# Patient Record
Sex: FEMALE | Race: WHITE | NOT HISPANIC OR LATINO | Employment: OTHER | URBAN - METROPOLITAN AREA
[De-identification: names, ages, dates, MRNs, and addresses within clinical notes are randomized per-mention and may not be internally consistent; named-entity substitution may affect disease eponyms.]

---

## 2017-07-21 ENCOUNTER — HOSPITAL ENCOUNTER (INPATIENT)
Facility: CLINIC | Age: 37
Setting detail: SURGERY ADMIT
End: 2017-07-21
Attending: NEUROLOGICAL SURGERY | Admitting: NEUROLOGICAL SURGERY
Payer: COMMERCIAL

## 2018-08-02 RX ORDER — CALCIUM CARBONATE 500(1250)
1 TABLET ORAL 2 TIMES DAILY
COMMUNITY

## 2018-08-02 RX ORDER — HYDROCODONE BITARTRATE AND ACETAMINOPHEN 10; 325 MG/1; MG/1
1 TABLET ORAL EVERY 4 HOURS PRN
COMMUNITY

## 2018-08-03 NOTE — OR NURSING
UA done on 8/1/18 showed 1+ leuk est and 1-5 micro WBC and neg nitrite.  I talked with Dr. Pearl's nurse and she said Dr. Pearl felt it was not necessary to treat.

## 2018-08-03 NOTE — PHARMACY-ADMISSION MEDICATION HISTORY
Medication reconciliation interview completed by pre-admitting nurse, reviewed by pharmacy. No further clarifications needed.     Nurse Complete Alexandra Rosales RN Thu Aug 2, 2018  4:00 PM       Prior to Admission medications    Medication Sig Last Dose Taking? Auth Provider   calcium carbonate (OS-RAVI 500 MG [place]. [place]) 500 MG tablet Take 1 tablet by mouth 2 times daily  Yes Reported, Patient   HYDROcodone-acetaminophen (NORCO)  MG per tablet Take 1 tablet by mouth every 4 hours as needed for severe pain  Yes Reported, Patient   METHOCARBAMOL PO Take 500 mg by mouth 3 times daily as needed for muscle spasms  Yes Reported, Patient   SERTRALINE HCL PO Take 100 mg by mouth daily  Yes Reported, Patient   TOPIRAMATE PO Take 50 mg by mouth daily  Yes Reported, Patient   VITAMIN D, CHOLECALCIFEROL, PO Take 3,000 Units by mouth daily  Yes Reported, Patient   IBUPROFEN PO Take 400 mg by mouth every 6 hours as needed for moderate pain   Reported, Patient

## 2018-08-03 NOTE — OR NURSING
UA done on 8/1/18 showed 1+ leuk esterase, 1-5 micro WBC, small micro bacteria & negative nitrites.  Dr. Delgado's office notified.

## 2018-08-08 NOTE — OR NURSING
Pt called, has plant dermatitis back of leg, was prescribed prednisone and wants to know if it will interfere with surgery, called to inform that she should call Dr. Rosales's office

## 2018-08-09 ENCOUNTER — HOSPITAL ENCOUNTER (INPATIENT)
Facility: CLINIC | Age: 38
LOS: 2 days | Discharge: HOME | DRG: 454 | End: 2018-08-11
Attending: NEUROLOGICAL SURGERY | Admitting: NEUROLOGICAL SURGERY
Payer: MEDICARE

## 2018-08-09 ENCOUNTER — ANESTHESIA EVENT (OUTPATIENT)
Dept: SURGERY | Facility: CLINIC | Age: 38
DRG: 454 | End: 2018-08-09
Payer: MEDICARE

## 2018-08-09 ENCOUNTER — APPOINTMENT (OUTPATIENT)
Dept: GENERAL RADIOLOGY | Facility: CLINIC | Age: 38
DRG: 454 | End: 2018-08-09
Attending: NEUROLOGICAL SURGERY
Payer: MEDICARE

## 2018-08-09 ENCOUNTER — ANESTHESIA (OUTPATIENT)
Dept: SURGERY | Facility: CLINIC | Age: 38
DRG: 454 | End: 2018-08-09
Payer: MEDICARE

## 2018-08-09 DIAGNOSIS — M51.16 LUMBAR DISC DISEASE WITH RADICULOPATHY: ICD-10-CM

## 2018-08-09 DIAGNOSIS — F11.90 CHRONIC, CONTINUOUS USE OF OPIOIDS: Primary | ICD-10-CM

## 2018-08-09 LAB
ABO + RH BLD: NORMAL
ABO + RH BLD: NORMAL
BLD GP AB SCN SERPL QL: NORMAL
BLOOD BANK CMNT PATIENT-IMP: NORMAL
HCG UR QL: NEGATIVE
SPECIMEN EXP DATE BLD: NORMAL

## 2018-08-09 PROCEDURE — 25000125 ZZHC RX 250: Performed by: NEUROLOGICAL SURGERY

## 2018-08-09 PROCEDURE — 25000132 ZZH RX MED GY IP 250 OP 250 PS 637: Mod: GY | Performed by: NEUROLOGICAL SURGERY

## 2018-08-09 PROCEDURE — 36415 COLL VENOUS BLD VENIPUNCTURE: CPT | Performed by: ANESTHESIOLOGY

## 2018-08-09 PROCEDURE — 25000128 H RX IP 250 OP 636: Performed by: ANESTHESIOLOGY

## 2018-08-09 PROCEDURE — 27210794 ZZH OR GENERAL SUPPLY STERILE: Performed by: NEUROLOGICAL SURGERY

## 2018-08-09 PROCEDURE — 99222 1ST HOSP IP/OBS MODERATE 55: CPT | Performed by: HOSPITALIST

## 2018-08-09 PROCEDURE — 86900 BLOOD TYPING SEROLOGIC ABO: CPT | Performed by: ANESTHESIOLOGY

## 2018-08-09 PROCEDURE — 12000007 ZZH R&B INTERMEDIATE

## 2018-08-09 PROCEDURE — 99223 1ST HOSP IP/OBS HIGH 75: CPT | Performed by: NURSE PRACTITIONER

## 2018-08-09 PROCEDURE — 71000013 ZZH RECOVERY PHASE 1 LEVEL 1 EA ADDTL HR: Performed by: NEUROLOGICAL SURGERY

## 2018-08-09 PROCEDURE — 37000008 ZZH ANESTHESIA TECHNICAL FEE, 1ST 30 MIN: Performed by: NEUROLOGICAL SURGERY

## 2018-08-09 PROCEDURE — C1763 CONN TISS, NON-HUMAN: HCPCS | Performed by: NEUROLOGICAL SURGERY

## 2018-08-09 PROCEDURE — 99207 ZZC CONSULT E&M CHANGED TO INITIAL LEVEL: CPT | Performed by: HOSPITALIST

## 2018-08-09 PROCEDURE — 86901 BLOOD TYPING SEROLOGIC RH(D): CPT | Performed by: ANESTHESIOLOGY

## 2018-08-09 PROCEDURE — C1713 ANCHOR/SCREW BN/BN,TIS/BN: HCPCS | Performed by: NEUROLOGICAL SURGERY

## 2018-08-09 PROCEDURE — A9270 NON-COVERED ITEM OR SERVICE: HCPCS | Mod: GY | Performed by: NEUROLOGICAL SURGERY

## 2018-08-09 PROCEDURE — 40000277 XR SURGERY CARM FLUORO LESS THAN 5 MIN W STILLS: Mod: TC

## 2018-08-09 PROCEDURE — 25000128 H RX IP 250 OP 636: Performed by: NURSE ANESTHETIST, CERTIFIED REGISTERED

## 2018-08-09 PROCEDURE — 25000566 ZZH SEVOFLURANE, EA 15 MIN: Performed by: NEUROLOGICAL SURGERY

## 2018-08-09 PROCEDURE — 27211024 ZZHC OR SUPPLY OTHER OPNP: Performed by: NEUROLOGICAL SURGERY

## 2018-08-09 PROCEDURE — 81025 URINE PREGNANCY TEST: CPT | Performed by: ANESTHESIOLOGY

## 2018-08-09 PROCEDURE — 25000128 H RX IP 250 OP 636

## 2018-08-09 PROCEDURE — 25000128 H RX IP 250 OP 636: Performed by: NEUROLOGICAL SURGERY

## 2018-08-09 PROCEDURE — 0SG3071 FUSION OF LUMBOSACRAL JOINT WITH AUTOLOGOUS TISSUE SUBSTITUTE, POSTERIOR APPROACH, POSTERIOR COLUMN, OPEN APPROACH: ICD-10-PCS | Performed by: NEUROLOGICAL SURGERY

## 2018-08-09 PROCEDURE — 25000132 ZZH RX MED GY IP 250 OP 250 PS 637: Mod: GY | Performed by: NURSE PRACTITIONER

## 2018-08-09 PROCEDURE — 86850 RBC ANTIBODY SCREEN: CPT | Performed by: ANESTHESIOLOGY

## 2018-08-09 PROCEDURE — 36000071 ZZH SURGERY LEVEL 5 W FLUORO 1ST 30 MIN: Performed by: NEUROLOGICAL SURGERY

## 2018-08-09 PROCEDURE — 71000012 ZZH RECOVERY PHASE 1 LEVEL 1 FIRST HR: Performed by: NEUROLOGICAL SURGERY

## 2018-08-09 PROCEDURE — 25000125 ZZHC RX 250: Performed by: NURSE ANESTHETIST, CERTIFIED REGISTERED

## 2018-08-09 PROCEDURE — 3E0R33Z INTRODUCTION OF ANTI-INFLAMMATORY INTO SPINAL CANAL, PERCUTANEOUS APPROACH: ICD-10-PCS | Performed by: NEUROLOGICAL SURGERY

## 2018-08-09 PROCEDURE — 36000069 ZZH SURGERY LEVEL 5 EA 15 ADDTL MIN: Performed by: NEUROLOGICAL SURGERY

## 2018-08-09 PROCEDURE — 37000009 ZZH ANESTHESIA TECHNICAL FEE, EACH ADDTL 15 MIN: Performed by: NEUROLOGICAL SURGERY

## 2018-08-09 PROCEDURE — 95940 IONM IN OPERATNG ROOM 15 MIN: CPT | Performed by: NEUROLOGICAL SURGERY

## 2018-08-09 PROCEDURE — A9270 NON-COVERED ITEM OR SERVICE: HCPCS | Mod: GY | Performed by: NURSE PRACTITIONER

## 2018-08-09 PROCEDURE — 93010 ELECTROCARDIOGRAM REPORT: CPT | Performed by: INTERNAL MEDICINE

## 2018-08-09 PROCEDURE — 07DS3ZZ EXTRACTION OF VERTEBRAL BONE MARROW, PERCUTANEOUS APPROACH: ICD-10-PCS | Performed by: NEUROLOGICAL SURGERY

## 2018-08-09 PROCEDURE — 40000306 ZZH STATISTIC PRE PROC ASSESS II: Performed by: NEUROLOGICAL SURGERY

## 2018-08-09 PROCEDURE — 0SG30A0 FUSION OF LUMBOSACRAL JOINT WITH INTERBODY FUSION DEVICE, ANTERIOR APPROACH, ANTERIOR COLUMN, OPEN APPROACH: ICD-10-PCS | Performed by: NEUROLOGICAL SURGERY

## 2018-08-09 DEVICE — IMPLANTABLE DEVICE: Type: IMPLANTABLE DEVICE | Site: SPINE LUMBAR | Status: FUNCTIONAL

## 2018-08-09 RX ORDER — KETOROLAC TROMETHAMINE 30 MG/ML
30 INJECTION, SOLUTION INTRAMUSCULAR; INTRAVENOUS EVERY 6 HOURS
Status: DISCONTINUED | OUTPATIENT
Start: 2018-08-10 | End: 2018-08-10

## 2018-08-09 RX ORDER — FENTANYL CITRATE 50 UG/ML
25-50 INJECTION, SOLUTION INTRAMUSCULAR; INTRAVENOUS
Status: DISCONTINUED | OUTPATIENT
Start: 2018-08-09 | End: 2018-08-09 | Stop reason: HOSPADM

## 2018-08-09 RX ORDER — CEFAZOLIN SODIUM 1 G/3ML
1 INJECTION, POWDER, FOR SOLUTION INTRAMUSCULAR; INTRAVENOUS SEE ADMIN INSTRUCTIONS
Status: DISCONTINUED | OUTPATIENT
Start: 2018-08-09 | End: 2018-08-09 | Stop reason: HOSPADM

## 2018-08-09 RX ORDER — KETOROLAC TROMETHAMINE 30 MG/ML
30 INJECTION, SOLUTION INTRAMUSCULAR; INTRAVENOUS EVERY 6 HOURS
Status: DISCONTINUED | OUTPATIENT
Start: 2018-08-09 | End: 2018-08-09

## 2018-08-09 RX ORDER — ACETAMINOPHEN 325 MG/1
975 TABLET ORAL EVERY 8 HOURS
Status: DISCONTINUED | OUTPATIENT
Start: 2018-08-09 | End: 2018-08-11 | Stop reason: HOSPADM

## 2018-08-09 RX ORDER — SODIUM CHLORIDE AND POTASSIUM CHLORIDE 150; 450 MG/100ML; MG/100ML
INJECTION, SOLUTION INTRAVENOUS CONTINUOUS
Status: DISCONTINUED | OUTPATIENT
Start: 2018-08-09 | End: 2018-08-10 | Stop reason: CLARIF

## 2018-08-09 RX ORDER — ONDANSETRON 4 MG/1
4 TABLET, ORALLY DISINTEGRATING ORAL EVERY 6 HOURS PRN
Status: DISCONTINUED | OUTPATIENT
Start: 2018-08-09 | End: 2018-08-11 | Stop reason: HOSPADM

## 2018-08-09 RX ORDER — PROPOFOL 10 MG/ML
INJECTION, EMULSION INTRAVENOUS PRN
Status: DISCONTINUED | OUTPATIENT
Start: 2018-08-09 | End: 2018-08-09

## 2018-08-09 RX ORDER — LORATADINE 10 MG/1
10 TABLET ORAL DAILY
COMMUNITY

## 2018-08-09 RX ORDER — NALOXONE HYDROCHLORIDE 0.4 MG/ML
.1-.4 INJECTION, SOLUTION INTRAMUSCULAR; INTRAVENOUS; SUBCUTANEOUS
Status: DISCONTINUED | OUTPATIENT
Start: 2018-08-09 | End: 2018-08-11 | Stop reason: HOSPADM

## 2018-08-09 RX ORDER — OXYCODONE HYDROCHLORIDE 5 MG/1
5 TABLET ORAL EVERY 4 HOURS PRN
Status: DISCONTINUED | OUTPATIENT
Start: 2018-08-09 | End: 2018-08-09

## 2018-08-09 RX ORDER — HYDROMORPHONE HYDROCHLORIDE 1 MG/ML
.2-.4 INJECTION, SOLUTION INTRAMUSCULAR; INTRAVENOUS; SUBCUTANEOUS
Status: DISCONTINUED | OUTPATIENT
Start: 2018-08-09 | End: 2018-08-10

## 2018-08-09 RX ORDER — HYDROXYZINE HYDROCHLORIDE 25 MG/1
25 TABLET, FILM COATED ORAL EVERY 6 HOURS PRN
Status: DISCONTINUED | OUTPATIENT
Start: 2018-08-09 | End: 2018-08-09

## 2018-08-09 RX ORDER — HYDRALAZINE HYDROCHLORIDE 20 MG/ML
2.5-5 INJECTION INTRAMUSCULAR; INTRAVENOUS EVERY 10 MIN PRN
Status: DISCONTINUED | OUTPATIENT
Start: 2018-08-09 | End: 2018-08-09 | Stop reason: HOSPADM

## 2018-08-09 RX ORDER — SERTRALINE HYDROCHLORIDE 100 MG/1
100 TABLET, FILM COATED ORAL DAILY
Status: DISCONTINUED | OUTPATIENT
Start: 2018-08-10 | End: 2018-08-11 | Stop reason: HOSPADM

## 2018-08-09 RX ORDER — ONDANSETRON 2 MG/ML
4 INJECTION INTRAMUSCULAR; INTRAVENOUS EVERY 30 MIN PRN
Status: DISCONTINUED | OUTPATIENT
Start: 2018-08-09 | End: 2018-08-09 | Stop reason: HOSPADM

## 2018-08-09 RX ORDER — ONDANSETRON 4 MG/1
4 TABLET, ORALLY DISINTEGRATING ORAL EVERY 30 MIN PRN
Status: DISCONTINUED | OUTPATIENT
Start: 2018-08-09 | End: 2018-08-09 | Stop reason: HOSPADM

## 2018-08-09 RX ORDER — METHOCARBAMOL 500 MG/1
500 TABLET, FILM COATED ORAL 3 TIMES DAILY PRN
Status: DISCONTINUED | OUTPATIENT
Start: 2018-08-09 | End: 2018-08-11 | Stop reason: HOSPADM

## 2018-08-09 RX ORDER — ONDANSETRON 2 MG/ML
INJECTION INTRAMUSCULAR; INTRAVENOUS PRN
Status: DISCONTINUED | OUTPATIENT
Start: 2018-08-09 | End: 2018-08-09

## 2018-08-09 RX ORDER — OXYCODONE HYDROCHLORIDE 5 MG/1
5-10 TABLET ORAL
Status: DISCONTINUED | OUTPATIENT
Start: 2018-08-09 | End: 2018-08-09 | Stop reason: DRUGHIGH

## 2018-08-09 RX ORDER — DEXAMETHASONE SODIUM PHOSPHATE 4 MG/ML
INJECTION, SOLUTION INTRA-ARTICULAR; INTRALESIONAL; INTRAMUSCULAR; INTRAVENOUS; SOFT TISSUE PRN
Status: DISCONTINUED | OUTPATIENT
Start: 2018-08-09 | End: 2018-08-09

## 2018-08-09 RX ORDER — GLYCOPYRROLATE 0.2 MG/ML
INJECTION, SOLUTION INTRAMUSCULAR; INTRAVENOUS PRN
Status: DISCONTINUED | OUTPATIENT
Start: 2018-08-09 | End: 2018-08-09

## 2018-08-09 RX ORDER — HYDROMORPHONE HYDROCHLORIDE 1 MG/ML
.3-.5 INJECTION, SOLUTION INTRAMUSCULAR; INTRAVENOUS; SUBCUTANEOUS EVERY 10 MIN PRN
Status: DISCONTINUED | OUTPATIENT
Start: 2018-08-09 | End: 2018-08-09 | Stop reason: HOSPADM

## 2018-08-09 RX ORDER — CYCLOBENZAPRINE HCL 10 MG
10 TABLET ORAL 3 TIMES DAILY PRN
Status: DISCONTINUED | OUTPATIENT
Start: 2018-08-09 | End: 2018-08-09

## 2018-08-09 RX ORDER — OXYCODONE HYDROCHLORIDE 5 MG/1
10-20 TABLET ORAL EVERY 4 HOURS PRN
Status: DISCONTINUED | OUTPATIENT
Start: 2018-08-09 | End: 2018-08-09

## 2018-08-09 RX ORDER — CEFAZOLIN SODIUM 1 G/50ML
1 INJECTION, SOLUTION INTRAVENOUS EVERY 8 HOURS
Status: COMPLETED | OUTPATIENT
Start: 2018-08-09 | End: 2018-08-10

## 2018-08-09 RX ORDER — OXYCODONE HYDROCHLORIDE 5 MG/1
15-20 TABLET ORAL
Status: DISCONTINUED | OUTPATIENT
Start: 2018-08-09 | End: 2018-08-11 | Stop reason: HOSPADM

## 2018-08-09 RX ORDER — FENTANYL CITRATE 50 UG/ML
25-50 INJECTION, SOLUTION INTRAMUSCULAR; INTRAVENOUS EVERY 5 MIN PRN
Status: DISCONTINUED | OUTPATIENT
Start: 2018-08-09 | End: 2018-08-09 | Stop reason: HOSPADM

## 2018-08-09 RX ORDER — TOPIRAMATE 25 MG/1
50 TABLET, FILM COATED ORAL DAILY
Status: DISCONTINUED | OUTPATIENT
Start: 2018-08-09 | End: 2018-08-10 | Stop reason: ALTCHOICE

## 2018-08-09 RX ORDER — PREDNISONE 20 MG/1
20 TABLET ORAL DAILY
Status: DISCONTINUED | OUTPATIENT
Start: 2018-08-09 | End: 2018-08-11 | Stop reason: HOSPADM

## 2018-08-09 RX ORDER — SODIUM CHLORIDE, SODIUM LACTATE, POTASSIUM CHLORIDE, CALCIUM CHLORIDE 600; 310; 30; 20 MG/100ML; MG/100ML; MG/100ML; MG/100ML
INJECTION, SOLUTION INTRAVENOUS CONTINUOUS
Status: DISCONTINUED | OUTPATIENT
Start: 2018-08-09 | End: 2018-08-09 | Stop reason: HOSPADM

## 2018-08-09 RX ORDER — METOPROLOL TARTRATE 1 MG/ML
1-2 INJECTION, SOLUTION INTRAVENOUS EVERY 5 MIN PRN
Status: DISCONTINUED | OUTPATIENT
Start: 2018-08-09 | End: 2018-08-09 | Stop reason: HOSPADM

## 2018-08-09 RX ORDER — LIDOCAINE 40 MG/G
CREAM TOPICAL
Status: DISCONTINUED | OUTPATIENT
Start: 2018-08-09 | End: 2018-08-09 | Stop reason: HOSPADM

## 2018-08-09 RX ORDER — ACETAMINOPHEN 325 MG/1
650 TABLET ORAL EVERY 4 HOURS PRN
Status: DISCONTINUED | OUTPATIENT
Start: 2018-08-12 | End: 2018-08-11 | Stop reason: HOSPADM

## 2018-08-09 RX ORDER — ONDANSETRON 2 MG/ML
4 INJECTION INTRAMUSCULAR; INTRAVENOUS EVERY 6 HOURS PRN
Status: DISCONTINUED | OUTPATIENT
Start: 2018-08-09 | End: 2018-08-11 | Stop reason: HOSPADM

## 2018-08-09 RX ORDER — LIDOCAINE 4 G/G
1 PATCH TOPICAL
Status: DISCONTINUED | OUTPATIENT
Start: 2018-08-09 | End: 2018-08-11 | Stop reason: HOSPADM

## 2018-08-09 RX ORDER — CEFAZOLIN SODIUM 2 G/100ML
2 INJECTION, SOLUTION INTRAVENOUS
Status: COMPLETED | OUTPATIENT
Start: 2018-08-09 | End: 2018-08-09

## 2018-08-09 RX ORDER — HYDROMORPHONE HYDROCHLORIDE 1 MG/ML
.5-1 INJECTION, SOLUTION INTRAMUSCULAR; INTRAVENOUS; SUBCUTANEOUS
Status: DISCONTINUED | OUTPATIENT
Start: 2018-08-09 | End: 2018-08-09

## 2018-08-09 RX ORDER — HYDROMORPHONE HYDROCHLORIDE 1 MG/ML
0.5 INJECTION, SOLUTION INTRAMUSCULAR; INTRAVENOUS; SUBCUTANEOUS ONCE
Status: COMPLETED | OUTPATIENT
Start: 2018-08-09 | End: 2018-08-09

## 2018-08-09 RX ORDER — ALBUTEROL SULFATE 0.83 MG/ML
2.5 SOLUTION RESPIRATORY (INHALATION) EVERY 4 HOURS PRN
Status: DISCONTINUED | OUTPATIENT
Start: 2018-08-09 | End: 2018-08-09 | Stop reason: HOSPADM

## 2018-08-09 RX ORDER — ZOLPIDEM TARTRATE 5 MG/1
5 TABLET ORAL
Status: DISCONTINUED | OUTPATIENT
Start: 2018-08-10 | End: 2018-08-11 | Stop reason: HOSPADM

## 2018-08-09 RX ORDER — NEOSTIGMINE METHYLSULFATE 1 MG/ML
VIAL (ML) INJECTION PRN
Status: DISCONTINUED | OUTPATIENT
Start: 2018-08-09 | End: 2018-08-09

## 2018-08-09 RX ORDER — LORATADINE 10 MG/1
10 TABLET ORAL DAILY
Status: DISCONTINUED | OUTPATIENT
Start: 2018-08-10 | End: 2018-08-11 | Stop reason: HOSPADM

## 2018-08-09 RX ORDER — LIDOCAINE HYDROCHLORIDE 10 MG/ML
INJECTION, SOLUTION INFILTRATION; PERINEURAL PRN
Status: DISCONTINUED | OUTPATIENT
Start: 2018-08-09 | End: 2018-08-09

## 2018-08-09 RX ORDER — BUPIVACAINE HYDROCHLORIDE 7.5 MG/ML
INJECTION, SOLUTION EPIDURAL; RETROBULBAR PRN
Status: DISCONTINUED | OUTPATIENT
Start: 2018-08-09 | End: 2018-08-09 | Stop reason: HOSPADM

## 2018-08-09 RX ORDER — LIDOCAINE 40 MG/G
CREAM TOPICAL
Status: DISCONTINUED | OUTPATIENT
Start: 2018-08-09 | End: 2018-08-11 | Stop reason: HOSPADM

## 2018-08-09 RX ORDER — DIAZEPAM 10 MG/2ML
2.5 INJECTION, SOLUTION INTRAMUSCULAR; INTRAVENOUS ONCE
Status: COMPLETED | OUTPATIENT
Start: 2018-08-09 | End: 2018-08-09

## 2018-08-09 RX ORDER — NALOXONE HYDROCHLORIDE 0.4 MG/ML
.1-.4 INJECTION, SOLUTION INTRAMUSCULAR; INTRAVENOUS; SUBCUTANEOUS
Status: DISCONTINUED | OUTPATIENT
Start: 2018-08-09 | End: 2018-08-09 | Stop reason: HOSPADM

## 2018-08-09 RX ORDER — HYDROMORPHONE HYDROCHLORIDE 1 MG/ML
INJECTION, SOLUTION INTRAMUSCULAR; INTRAVENOUS; SUBCUTANEOUS
Status: COMPLETED
Start: 2018-08-09 | End: 2018-08-09

## 2018-08-09 RX ORDER — MEPERIDINE HYDROCHLORIDE 50 MG/ML
12.5 INJECTION INTRAMUSCULAR; INTRAVENOUS; SUBCUTANEOUS
Status: DISCONTINUED | OUTPATIENT
Start: 2018-08-09 | End: 2018-08-09 | Stop reason: HOSPADM

## 2018-08-09 RX ORDER — HYDROXYZINE HYDROCHLORIDE 25 MG/1
25-50 TABLET, FILM COATED ORAL EVERY 6 HOURS
Status: DISCONTINUED | OUTPATIENT
Start: 2018-08-09 | End: 2018-08-11 | Stop reason: HOSPADM

## 2018-08-09 RX ORDER — FENTANYL CITRATE 50 UG/ML
INJECTION, SOLUTION INTRAMUSCULAR; INTRAVENOUS PRN
Status: DISCONTINUED | OUTPATIENT
Start: 2018-08-09 | End: 2018-08-09

## 2018-08-09 RX ADMIN — ROCURONIUM BROMIDE 30 MG: 10 INJECTION INTRAVENOUS at 10:10

## 2018-08-09 RX ADMIN — HYDROMORPHONE HYDROCHLORIDE 1 MG: 1 INJECTION, SOLUTION INTRAMUSCULAR; INTRAVENOUS; SUBCUTANEOUS at 10:40

## 2018-08-09 RX ADMIN — PROPOFOL 200 MG: 10 INJECTION, EMULSION INTRAVENOUS at 10:10

## 2018-08-09 RX ADMIN — ACETAMINOPHEN 975 MG: 325 TABLET, FILM COATED ORAL at 16:11

## 2018-08-09 RX ADMIN — Medication 0.5 MG: at 12:18

## 2018-08-09 RX ADMIN — Medication 0.5 MG: at 14:04

## 2018-08-09 RX ADMIN — HYDROXYZINE HYDROCHLORIDE 25 MG: 25 TABLET ORAL at 21:51

## 2018-08-09 RX ADMIN — DEXMEDETOMIDINE HYDROCHLORIDE 0.4 MCG/KG/HR: 100 INJECTION, SOLUTION INTRAVENOUS at 10:16

## 2018-08-09 RX ADMIN — OXYCODONE HYDROCHLORIDE 20 MG: 5 TABLET ORAL at 21:51

## 2018-08-09 RX ADMIN — DEXAMETHASONE SODIUM PHOSPHATE 8 MG: 4 INJECTION, SOLUTION INTRA-ARTICULAR; INTRALESIONAL; INTRAMUSCULAR; INTRAVENOUS; SOFT TISSUE at 10:10

## 2018-08-09 RX ADMIN — OXYCODONE HYDROCHLORIDE 10 MG: 5 TABLET ORAL at 15:59

## 2018-08-09 RX ADMIN — GLYCOPYRROLATE 0.6 MG: 0.2 INJECTION, SOLUTION INTRAMUSCULAR; INTRAVENOUS at 11:31

## 2018-08-09 RX ADMIN — Medication 0.5 MG: at 12:47

## 2018-08-09 RX ADMIN — Medication 0.5 MG: at 09:33

## 2018-08-09 RX ADMIN — ACETAMINOPHEN 975 MG: 325 TABLET, FILM COATED ORAL at 23:10

## 2018-08-09 RX ADMIN — LIDOCAINE 1 PATCH: 560 PATCH PERCUTANEOUS; TOPICAL; TRANSDERMAL at 19:47

## 2018-08-09 RX ADMIN — Medication 3 MG: at 11:31

## 2018-08-09 RX ADMIN — FENTANYL CITRATE 100 MCG: 50 INJECTION, SOLUTION INTRAMUSCULAR; INTRAVENOUS at 10:10

## 2018-08-09 RX ADMIN — HYDROXYZINE HYDROCHLORIDE 25 MG: 25 TABLET ORAL at 16:11

## 2018-08-09 RX ADMIN — Medication 1 MG: at 15:45

## 2018-08-09 RX ADMIN — MIDAZOLAM 2 MG: 1 INJECTION INTRAMUSCULAR; INTRAVENOUS at 10:03

## 2018-08-09 RX ADMIN — FENTANYL CITRATE 50 MCG: 50 INJECTION INTRAMUSCULAR; INTRAVENOUS at 12:40

## 2018-08-09 RX ADMIN — Medication 0.5 MG: at 12:25

## 2018-08-09 RX ADMIN — Medication 1 MG: at 17:54

## 2018-08-09 RX ADMIN — HYDROXYZINE HYDROCHLORIDE 25 MG: 25 TABLET ORAL at 23:05

## 2018-08-09 RX ADMIN — LIDOCAINE HYDROCHLORIDE 50 MG: 10 INJECTION, SOLUTION INFILTRATION; PERINEURAL at 10:10

## 2018-08-09 RX ADMIN — FENTANYL CITRATE 50 MCG: 50 INJECTION INTRAMUSCULAR; INTRAVENOUS at 12:13

## 2018-08-09 RX ADMIN — Medication 2.5 MG: at 13:19

## 2018-08-09 RX ADMIN — PROPOFOL 100 MG: 10 INJECTION, EMULSION INTRAVENOUS at 10:45

## 2018-08-09 RX ADMIN — PREDNISONE 20 MG: 20 TABLET ORAL at 16:06

## 2018-08-09 RX ADMIN — ONDANSETRON 4 MG: 2 INJECTION INTRAMUSCULAR; INTRAVENOUS at 10:54

## 2018-08-09 RX ADMIN — KETOROLAC TROMETHAMINE 30 MG: 30 INJECTION, SOLUTION INTRAMUSCULAR at 23:11

## 2018-08-09 RX ADMIN — SODIUM CHLORIDE, POTASSIUM CHLORIDE, SODIUM LACTATE AND CALCIUM CHLORIDE: 600; 310; 30; 20 INJECTION, SOLUTION INTRAVENOUS at 10:30

## 2018-08-09 RX ADMIN — Medication 2.5 MG: at 14:37

## 2018-08-09 RX ADMIN — CEFAZOLIN SODIUM 1 G: 1 INJECTION, SOLUTION INTRAVENOUS at 18:57

## 2018-08-09 RX ADMIN — CEFAZOLIN SODIUM 2 G: 2 INJECTION, SOLUTION INTRAVENOUS at 10:03

## 2018-08-09 RX ADMIN — HYDROMORPHONE HYDROCHLORIDE 1 MG: 1 INJECTION, SOLUTION INTRAMUSCULAR; INTRAVENOUS; SUBCUTANEOUS at 10:16

## 2018-08-09 RX ADMIN — SODIUM CHLORIDE, POTASSIUM CHLORIDE, SODIUM LACTATE AND CALCIUM CHLORIDE: 600; 310; 30; 20 INJECTION, SOLUTION INTRAVENOUS at 10:03

## 2018-08-09 RX ADMIN — TOPIRAMATE 50 MG: 25 TABLET, FILM COATED ORAL at 15:59

## 2018-08-09 RX ADMIN — METHOCARBAMOL 500 MG: 500 TABLET ORAL at 19:45

## 2018-08-09 RX ADMIN — FENTANYL CITRATE 50 MCG: 50 INJECTION INTRAMUSCULAR; INTRAVENOUS at 12:25

## 2018-08-09 RX ADMIN — POTASSIUM CHLORIDE AND SODIUM CHLORIDE: 450; 150 INJECTION, SOLUTION INTRAVENOUS at 16:25

## 2018-08-09 RX ADMIN — OXYCODONE HYDROCHLORIDE 20 MG: 5 TABLET ORAL at 18:51

## 2018-08-09 RX ADMIN — HYDROMORPHONE HYDROCHLORIDE 1 MG: 1 INJECTION, SOLUTION INTRAMUSCULAR; INTRAVENOUS; SUBCUTANEOUS at 10:45

## 2018-08-09 ASSESSMENT — ACTIVITIES OF DAILY LIVING (ADL)
ADLS_ACUITY_SCORE: 13
ADLS_ACUITY_SCORE: 11

## 2018-08-09 NOTE — IP AVS SNAPSHOT
[facility]    [address] [facility] [address]    Phone:  [phone]    Fax:  [phone]                                       After Visit Summary   8/9/2018    Tiffany Solis    MRN: [id number]           After Visit Summary Signature Page     I have received my discharge instructions, and my questions have been answered. I have discussed any challenges I see with this plan with the nurse or doctor.    ..........................................................................................................................................  Patient/Patient Representative Signature      ..........................................................................................................................................  Patient Representative Print Name and Relationship to Patient    ..................................................               ................................................  Date                                            Time    ..........................................................................................................................................  Reviewed by Signature/Title    ...................................................              ..............................................  Date                                                            Time

## 2018-08-09 NOTE — OP NOTE
REPORT OF OPERATION  Tiffany Solis is a 37 year old old female admitted on 8/9/2018  7:52 AM.    Operative Date:  8/9/2018  PRE-PROCEDURE DIAGNOSIS:  1) L5/S1 degenerative disc disease.  2)BMI 47 Obesety  POST-PROCEDURE DIAGNOSIS:  1) Same as above  PROCEDURE PERFORMED:  1)  L5/S1 oblique lateral lumbar interbody fusion with discectomy, preparation of the endplate and placement of a bullet cage packed with calcium triphosphate soaked in bone marrow anterior to the transverse process in modified prone position, with intraoperative biplanar fluoroscopic imaging and electrophysiological monitoring.  2)  L5/S1 Posterior minimally invasive pedicle screw placement and posterolateral instrumentation and fusion with  intraoperative biplanar fluoroscopic imaging and electrophysiological monitoring.  3) Epidural steroid injection.  4) Transpedicular Bone marrow aspiration  5) Injection of 0.75 marcain  in paracvertebral tissue for post op pain management  Surgeon: Thu Delgado MD  ASSISTANT: Angelique Nguyen MD   HISTORY: Please refer to my clinic note for full details, but in short, patient is a 37 -year-old female with severe back pain and radiculopathy not responding to usual conservative therapy. Patient was set up for the surgery as mentioned above and was taken to surgery as mentioned above after all risks and benefits were explained.  PROCEDURE:  The patient was taken to surgery. After general anesthesia was applied, SCDs and Caraballo placed and preoperative antibiotic given, then patient has been positioned on the Fidel table and Demetrius frame in a modified prone position for ease of access from the left side.  AP and lateral fluoroscopic images are positioned. Patient has been prepped and draped in sterile fashion. The landmarks, including Spinal process, transverse process, disk space, endplates and pedicels are identified and marked.    A Jamshidi needle is placed in upper right pedicle inside of the vertebral  body and bone marrow has been aspirated to be mixed with biologics to introduce Stem cells to the biologics.  Following steps are then taken for levels:    L5/S1   Cage size 10 mm high and 27 mm long Titanium  The patient was turned using the rotation of the surgical table so that a near direct anterior-lateral approach to the lumbar spine could be achieved. A smal  incision was then made superior to the mid iliac crest and then using biplanar fluoroscopic visualization, under electrophysiological monitoring and stimulation, we introduced an electrophysiological probe through the retro peritoneal space into the desired discs anterior to the transverse processes and then passed it into the disc space after finding a silent window. The sleeve is retained and the probe is removed, then a K wire was passed sequentially into the disk space. A dilating tube was then passed along this same route. Following this, a working channel was then passed sequentially into the disc spaces. The working channel was manually held in position while a series of disc cleaning tools was passed through the channel to remove the affected discs under clear and direct biplanar fluoroscopic visualization, decompress the nerve roots, and decorticate the vertebral endplates at those segments.    Arthrodesis of the intervertebral spaces via an anterior retroperitoneal exposure and application of an intervertebral biomechanical device was then accomplished by using the working channel that had been placed in the retroperitoneal space anterior to the transverse processes. After adequate decompression and preparation of the endplates, we then put calcium triphosphate soaked in bone marrow anterior into disc space and then a PEEK interbody was packed tightly with allograft bone for stabilization and arthrodesis of the intervertebral spaces and inserted into the mid portion of the intervertebral discs. This was done under biplanar fluoroscopic  visualization. All bone was confined to the borders of the disc space. The working channel was then removed.    Following steps are then taken for levels:  L5 7.5mm Screw size Right 55 Left 55  Bicortical for High BMI and for mechanical stability    S1 7.5mm Screw size Right 50 Left 50  Bicortical for High BMI and for mechanical stability      Then patient is rotated for a true prone position. Then entry point for the pedicles is identified in the AP and lateral view, and then skin incision has been injected with local anesthetic. Then we entered the pedicle with a Jamshidi needle. Over the Jamshidi needle, we introduced the K wire in Vertebral body. Additionally we use a small periostal elevator along the screws to refresh the surface of the bone and facet and put minimal amount of Calcium-triphoisphate soaked in bone marrow for additional posterolateral fusion. Over the K wire then , we dilate the muscle with the dilator and then put a pedicle screws bilaterally. Screws are all silent up to  25 MA of stimulation. After screws are all placed, we put marlena in place and under fluoroscopic imaging, we locked the marlena in place and removed the screw tops and then each incision has been closed with 2-0 Vicryl suture and then Steri-Strips applied.  Before the end of the surgery, we injected 40mg Kenalog and 1 cc 0.25% Marcaine for epidural steroid injection in epidural space under fluoroscopic imaging after we introduced the spinal needle and confirmed with injecting 2cc air and aspiration which confirms we are in the epidural space and no CSF is returned.  20 cc of marcaine 0.75 was injected into deep tissue at the end of surgery for post operative pain management.  Before closing skin we inject 17 cc 0.75% marcaine in paravertebral tissue for post op pain management.    Additional finding: BMI 47  Obesity made the surgery technically more challenging and so  extended the surgery time  Estimated blood: 28cc.    DISPOSITION:  To PACU with postoperative antibiotic. All counts are correct at the end of the surgery.  Thu Delgado MD  cc: Thu Delgado MD

## 2018-08-09 NOTE — ANESTHESIA CARE TRANSFER NOTE
Patient: Tiffany Solis    Procedure(s):  L5 to S1 Oblique lumbar interbody fusion - Wound Class: I-Clean    Diagnosis: DDD, HNP  Diagnosis Additional Information: No value filed.    Anesthesia Type:   General, ETT     Note:  Airway :Face Mask  Patient transferred to:PACU  Handoff Report: Identifed the Patient, Identified the Reponsible Provider, Reviewed the pertinent medical history, Discussed the surgical course, Reviewed Intra-OP anesthesia mangement and issues during anesthesia, Set expectations for post-procedure period and Allowed opportunity for questions and acknowledgement of understanding      Vitals: (Last set prior to Anesthesia Care Transfer)    CRNA VITALS  8/9/2018 1113 - 8/9/2018 1150      8/9/2018             NIBP: 118/72    NIBP Mean: 89                Electronically Signed By: BAILEY Pacheco CRNA  August 9, 2018  11:50 AM

## 2018-08-09 NOTE — CONSULTS
[facility]  Hospitalist Consult Note    Name: Tiffany Solis      MRN: [id number]  YOB: 1980    Age: 37 year old  Date of admission: 8/9/2018  Primary care provider: Sandra Bender     Requesting Physician:  Dr Jenkins  Reason for consultation:  Post-operative medical management         Assessment and Plan:   Tiffany Solis is a 37 year old female with a history of depression, migraines, and lumbar spine disease who was admitted for lumbar spine fusion.    1. Lumbar fusion on 8/9: The patient is doing well, currently has well controlled pain and is hemodynamically stable. The hospitalist service will defer diet, activity, DVT prophylaxis, and pain control to the surgical service. Currently the patient is on PCDs for DVT prophylaxis. We agree with continued physical and occupational therapy. Social work may be consulted for possible placement needs. Please continue incentive spirometry and check routine follow up hemoglobin to evaluate for surgical blood loss and the potential need for transfusion.     2. Depression and migraines: Stable.  Resumed PTA medications.    3.  Dermatitis: Resumed 5 day course of prednisone as already approved by spine surgery.      Code status: FULL  Prophylaxis: Currently on PCDs, ultimately deferred to the primary service.  Disposition: Deferred to the primary service    Thank you for the consultation, we will continue to follow along during the hospitalization. Please page with any questions or concerns.         History of Present Illness:   Tiffany Solis is a 37 year old female who is being hospitalized for lumbar fusion. Pre-operative note was fully reviewed and recommendations acknowledged. Op note and anesthesia notes and flow sheets were also reviewed.     The patient had no complications related to the procedure and has had an unremarkable post-operative course to this point. Currently pain is adequately controlled. No nausea, vomiting,  "diarrhea or constipation. No fevers, chills, diaphoresis. No chest pain, palpitations, dyspnea. No excessive somnolence and patient is fully alert and oriented. The patient has no other complaints at this time.                Past Medical History:     Past Medical History:   Diagnosis Date     Depression     & anxiety     Low back pain     & radiates down both legs (mainly left leg).  Also, has numbness & tingling down both legs.     Migraine              Past Surgical History:     Past Surgical History:   Procedure Laterality Date     BACK SURGERY      cervical fusion x 7     ENT SURGERY      Tonsillectomy     GYN SURGERY      tubal ligation               Social History:     Social History   Substance Use Topics     Smoking status: Never Smoker     Smokeless tobacco: Never Used     Alcohol use Yes      Comment: rare             Family History:   Family history was fully reviewed and non-contributory in this case.          Allergies:     Allergies   Allergen Reactions     Kiwi Other (See Comments)     \"my mouth bleeds\"     Neurontin [Gabapentin] Other (See Comments)     Memory loss     Pineapple Other (See Comments)     \"my throat swells\"     Seasonal Allergies Other (See Comments)     hayfever symptoms             Medications:     Prior to Admission medications    Medication Sig Last Dose Taking? Auth Provider   calcium carbonate (OS-RAVI 500 MG [place]. [place]) 500 MG tablet Take 1 tablet by mouth 2 times daily 8/2/2018 Yes Reported, Patient   HYDROcodone-acetaminophen (NORCO)  MG per tablet Take 1 tablet by mouth every 4 hours as needed for severe pain 8/9/2018 at Unknown time Yes Reported, Patient   loratadine (CLARITIN) 10 MG tablet Take 10 mg by mouth daily 8/9/2018 at Unknown time Yes Reported, Patient   METHOCARBAMOL PO Take 500 mg by mouth 3 times daily as needed for muscle spasms Past Week at Unknown time Yes Reported, Patient   PREDNISONE PO Take 20 mg by mouth daily 8/9/2018 at Unknown time Yes Reported, " "Patient   SERTRALINE HCL PO Take 100 mg by mouth daily 8/9/2018 at Unknown time Yes Reported, Patient   TOPIRAMATE PO Take 50 mg by mouth daily 8/9/2018 at Unknown time Yes Reported, Patient   VITAMIN D, CHOLECALCIFEROL, PO Take 3,000 Units by mouth daily 8/2/2018 Yes Reported, Patient   IBUPROFEN PO Take 400 mg by mouth every 6 hours as needed for moderate pain 8/2/2018  Reported, Patient       Current hospital administered medication list (MAR) also reviewed.          Review of Systems:   A comprehensive greater than 10 system review of systems was carried out.  Pertinent positives and negatives are noted above.  Otherwise negative for contributory info.            Physical Exam:   Blood pressure 115/65, temperature 98.1  F (36.7  C), temperature source Temporal, resp. rate 14, height 1.549 m (5' 1\"), weight 112.9 kg (249 lb), last menstrual period 07/02/2018, SpO2 96 %.  Exam:  GENERAL: No apparent distress. Awake, alert, and fully oriented.  HEENT: Normocephalic, atraumatic. Extraocular movements intact.  CARDIOVASCULAR: Regular rate and rhythm without murmurs or rubs. No S3.  PULMONARY: Clear bilaterally.  ABDOMINAL: Soft, non-tender, non-distended. Bowel sounds normoactive. No hepatosplenomegaly.  EXTREMITIES: No cyanosis or clubbing. No edema.  NEUROLOGICAL: CN 2-12 grossly intact, no focal neurological deficits.  DERMATOLOGICAL: No rash, ulcer, ecchymoses, jaundice.         Data:   EKG/Telemetry:  Personally reviewed available data.    Laboratory: Personally reviewed available data.  No results for input(s): WBC, HGB, HCT, MCV, PLT in the last 168 hours.  No results for input(s): NA, POTASSIUM, CHLORIDE, CO2, ANIONGAP, GLC, BUN, CR, GFRESTIMATED, GFRESTBLACK, RAVI in the last 168 hours.  No results for input(s): CULT in the last 168 hours.    Imaging: Personally reviewed available data.  Recent Results (from the past 24 hour(s))   XR Surgery NIDHI L/T 5 Min Fluoro w Stills    Narrative    This exam was marked " as non-reportable because it will not be read by a   radiologist or a [place] non-radiologist provider.                   Alvin Santiago DO MPH  [facility] Hospitalist  [address] [place] [address]  Pager: [phone]  08/09/2018

## 2018-08-09 NOTE — PROVIDER NOTIFICATION
Dicussed with Dr. Fernández, pt continues to complain of pain rate 8/10 or greater.  Per Dr. Delgado, start oxycodone 10-20mg q 4 hours PRN and 30mg toradol Q 6 scheduled.  Pt moving out of bed will also be helpful to control pain.

## 2018-08-09 NOTE — PLAN OF CARE
Problem: Patient Care Overview  Goal: Plan of Care/Patient Progress Review  PT: Pt not up to room at scheduled PT time. Will evaluate tomorrow AM.

## 2018-08-09 NOTE — ANESTHESIA PREPROCEDURE EVALUATION
Anesthesia Evaluation     . Pt has had prior anesthetic. Type: General    No history of anesthetic complications          ROS/MED HX    ENT/Pulmonary:  - neg pulmonary ROS     Neurologic:     (+)other neuro weak limbs, numbness and tingling of feet, gait instability, s1 radiculopathy bilaterally    Cardiovascular:  - neg cardiovascular ROS       METS/Exercise Tolerance:     Hematologic:         Musculoskeletal:         GI/Hepatic:         Renal/Genitourinary:         Endo:     (+) Obesity (morbid), .      Psychiatric:     (+) psychiatric history anxiety      Infectious Disease:         Malignancy:         Other:                     Physical Exam      Airway   Mallampati: II  TM distance: >3 FB  Neck ROM: limited    Dental     Cardiovascular   Rhythm and rate: regular and normal      Pulmonary    breath sounds clear to auscultation                    Anesthesia Plan      History & Physical Review  History and physical reviewed and following examination; no interval change.    ASA Status:  3 .    NPO Status:  > 8 hours    Plan for General and ETT with Propofol and Intravenous induction. Maintenance will be Balanced.    PONV prophylaxis:  Ondansetron (or other 5HT-3) and Dexamethasone or Solumedrol       Postoperative Care  Postoperative pain management:  IV analgesics, Oral pain medications and Multi-modal analgesia.      Consents  Anesthetic plan, risks, benefits and alternatives discussed with:  Patient..                          .

## 2018-08-09 NOTE — CONSULTS
[facility]  Pain Service Consultation   Text Page    Date of Admission:  8/9/2018    Assessment & Plan   Tiffany Solis is a 37 year old female who was admitted on 8/9/2018. I was asked by Dr. Thu Rosales MD to see the patient for  Acute post op pain .    1)  Acute on chronic  Pain s/p 2 level fusion L4-S1 and discectomy L4-5.POD 0    2)  Patient with chronic pain, on chronic opioid therapy managed by  Thu Rosales MD  Baseline 75-90 mg Daily Morphine Equivalent as dispensed as reported daily use.  Patient has a high opioid tolerance.     Patient's opioid use in past 24 hours: Hydromorphone 7.5 mg, Oxycodone 30 mg  = 195 mg Daily Morphine Equivalent    3)  Risk factors for opioid related harms  -High opioid dose (>50 MME/day)  -Anxiety/depression  -Opioid has recently been changed    4)  Opioid induced side-effects:  -Constipation no   -Nausea/Vomit no   -Sedation no   -Urinary Retention has bowling    5)  Other/Related:    -Depression/anxiety   -Deconditioning    -obesity     PLAN:   1)  Educated on the normal trajectory of acute post op pain.    2)  Encouraged use opioid sparing medications   3)Non-opioid multimodal medication therapy  -Topical:Voltaren 1% Topical Gel tid and Lidocaine Patch 4%  Prn to neck and upper back  -N-SAIDS:Ketorolac 50 mg every 6 hrs IV for tonight   -Muscle Relaxants:Methocarbamol 500 mg tid prn as chronic  -Adjuvants:Acetaminophen 975 mg every 8 hrs x 3 day  No gabapentin or lyrica due to AE's  Continue Topamax 50 mg as migraine preventive, may also help with radicular pain   -Antidepresants/anxiolytics:Sertraline  100 mg as chronic  4)  Non-medication interventions  Positioning, ICE, Relaxation, Essential oils per nursing  5)  Opioids:change Oxycodone to 15-20 mg every 3 hrs prn, Dilaudid 0.2-0.4 mg IV every 3 hrs prn BTP only.   Encourage use of oral opioids over IV  Capnography continuous  Narcan as rescue for opioid reversal  Opioids Treatment Goal: -Improvement in  function  -Participate in PT  -Post-operative management of pain, expected 3-7 days needed  6)  Constipation Prophylaxis   Continue to Monitor, Bowel Meds PRN per Constipation Order Set, Senna-S prn   7)  Pain Education  -Opioid safe use, storage and disposal information included in DC AVS  8)  DC Planning   Discussed goal of Opioid therapy as above with patient and nurse   Recommend short supply of opioids only ( 3 days) per CDC guidelines for acute pain management.  Continued outpatient management of pain per  Temitope Fernández MD   Disposition: home   Support systems:  Sister   Outpatient Referrals: possible Physical Therapy   The following risk factors have been identified for unintentional overdose: patient is overweight, patient is taking a high amount of opioids in 24 hour period and patient has anxiety, depression or PTSD. Discharge with intra-nasal naloxone if discharged to home with opioids.  Plan for education prior to discharge    Time Spent on this Encounter   I spent  20 minutes (18:15-18:40) in assessment of the patient, counseling and discussion with the patient and family as documented in sections below. Another 60  minutes in review of chart, documentation, coordination of care and discussion with the health care team.    Ami AVALOS CNP  Pain Management and Palliative Care  [facility]  Pgr: [phone]      Reason for Consult   Reason for consult: I was asked by Thu Rosales MD to evaluate this patient for  Acute post op pain management.    Primary Care Physician   Primary Care Physician:Sandra Guillen MD  Pain Specialist: Thu Rosales MD    Chief Complaint   Acute post op pain     History is obtained from the patient, electronic health record and patient's sister    History of Present Illness   Tiffnay Solis is a 37 year old female who presents with acute post op pain. She has a 12- 14 month history of chronic left lumbar low back pain. Her  pain course was fairly stable about a month ago after she bent over to  a toy.  She noted and increase in her back and left leg pain, which was poorly control with conservative therapy.   The patient has been on opioids for the past year or so on stable doses of moderate amounts until this incident. She now on higher doses of opioids that still achieve poor pain control.  The patient is s/p L4-5 L5S1 IBF with discectomy at L4-5 . She is POD ) with minimal blood loss. The patient received an SHAHRAM and marcaine in the paracvertebral space for postoperative pain management.    The patient has a Past medical history of Degenerative Disc Disease, morbid obesity,depression and Migraines. Her migraine pain is well controlled with Topamax.    She denies headache, dizziness, chest pain, SOB, calf pain, bowel or bladder. She has a bowling to DD.     CURRENT PAIN:  Her pain is located in the  Low back and left leg to foot   It is described as Penetrating, Sharp, Shooting and Unbearable  She rates it as ranging between 9/10 and 10/10  The average is 9/10 on a scale of 0-10  Currently it is rated as 9/10  It improves by  nothing   It worsens by  Cant determine  She  been compliant with the recommendations while in the hospital.      PAIN HISTORY:  The pain is mainly located in the  Low back, left leg, headache, neck and upper back muscle pain  It is described as Sharp, Shooting, Stabbing and Unbearable  Rates it as ranging between 6/10 and 10/10  Currently it is rated as 9/10  It improves by  Medications   It worsens by  Medications wearing off   She  been compliant with the recommendations while in the hospital.      PAST PAIN TREATMENT:   Medications:gabapentin, Lyrica, EMU cream, Hydrocodone, Robaxin    Non-phamacologic modalities: ice, rest   Previous interventions/surgeries: cervical fusion    [place] review:  Yes, noted for Hydrocodone 7.5 mg/325 mg @ 120/month with recent change t 10 mg/325 mg about 3 weeks  ago.. consistent prescribing provider and pharmacy           D.I.R.E Score: Patient Selection for Chronic Opioid Analgesia    For each factor, rate the patient's score from 1 - 3 based on the explanations on the right.       Diagnosis             2         1 = Benign chronic condition with minimal objective findings or no definite medical diagnosis.  Examples:  fibromyalgia, migraine, headaches, non-specific back pain.  2 = Slowly progressive condition concordant with moderate pain, or fixed condition with moderate objective findings.  Examples: failed back surgery syndrome, back pain with moderate degenerative changes, neuropathic pain.  3 = Advanced condition concordant with severe pain with objective findings.  Examples: severe ischemic vascular disease, advanced neuropathy, severe spinal stenosis.    Intractability             2         1 = Few therapies have been tried and the patient takes a passive role in his/her pain management process.   2 = Most costomary treatments have been tried but the patient is not fully engaged in the pain management process, or barriers prevent (insurance, transportation, medical illness)  3 = Patient fully engaged in a spectrum of appropriate treatments but with inadequate response.    Risk   (Risk = Total of P+C+R+S below)       Psychological             3         1 = Serious personality dysfunction or mental illness interfering with care.  Examples: personality disorder, severe affective disorder, significant personality issues.  2 = Personality or mental health interferes moderately.  Example: depression or anxiety disorder.  3 = Good communication with the clinic.  No significant personality dysfunction or mental illness.       Chemical      Health             3         1 = Active or very recent use of illicit drugs, excessive alcohol, or prescription drug abuse.  2 = Chemical coper (uses medications to cope with stress) or history of chemical dependency in remission.  3 = No  CD history.  Not drug-focused or chemically reliant       Reliability             2         1 = History of numerous problems: medication misuse, missed appointments, rarely follows through.  2 = Occasional difficulties with compliance, but generally reliable.  3 = Highly reliable patient with medications, appointments and treatment.       Social      Support             2         1= Life in chaos.  Little family support and few close relationships.  Loss of most normal life roles.  2 = Reduction in some relationships and life roles.  3 = Supportive family/close relationships.  Involved in work or school and no social isolation.    Efficacy score             2         1 = Poor function or minimal pain relief despite moderate to high doses.  2 = Moderate benefit with function improved in a number of ways (or insufficient info - hasn't tried opioid yet or very low doses or too short a trial.  3 = Good improvement in pain and function and quality of life with stable doses over time.                                    16    Total score = D + I + R + E    Score 7-13: Not a suitable candidate for long-term opioid analgesia  Score 14-21: May be a good candidate for long-term opioid analgesia    Copyright 2013 Tim Gann MD, The DIRE Score: Predicting Outcomes of Opioid Prescribing for Chronic Pain. The Journal of Pain. 7(9) (September), 2006:671-681    Past Medical History   I have reviewed this patient's medical history and updated it with pertinent information if needed.   Past Medical History:   Diagnosis Date     Depression     & anxiety     Low back pain     & radiates down both legs (mainly left leg).  Also, has numbness & tingling down both legs.     Migraine        Past Surgical History   I have reviewed this patient's surgical history and updated it with pertinent information if needed.  Past Surgical History:   Procedure Laterality Date     BACK SURGERY      cervical fusion x 7     ENT SURGERY       "Tonsillectomy     GYN SURGERY      tubal ligation         Prior to Admission Medications   Prior to Admission Medications   Prescriptions Last Dose Informant Patient Reported? Taking?   HYDROcodone-acetaminophen (NORCO)  MG per tablet 8/9/2018 at Unknown time  Yes Yes   Sig: Take 1 tablet by mouth every 4 hours as needed for severe pain   IBUPROFEN PO 8/2/2018  Yes No   Sig: Take 400 mg by mouth every 6 hours as needed for moderate pain   METHOCARBAMOL PO Past Week at Unknown time  Yes Yes   Sig: Take 500 mg by mouth 3 times daily as needed for muscle spasms   PREDNISONE PO 8/9/2018 at Unknown time  Yes Yes   Sig: Take 20 mg by mouth daily   SERTRALINE HCL PO 8/9/2018 at Unknown time  Yes Yes   Sig: Take 100 mg by mouth daily   TOPIRAMATE PO 8/9/2018 at Unknown time  Yes Yes   Sig: Take 50 mg by mouth daily   VITAMIN D, CHOLECALCIFEROL, PO 8/2/2018  Yes Yes   Sig: Take 3,000 Units by mouth daily   calcium carbonate (OS-RAVI 500 MG [place]. [place]) 500 MG tablet 8/2/2018  Yes Yes   Sig: Take 1 tablet by mouth 2 times daily   loratadine (CLARITIN) 10 MG tablet 8/9/2018 at Unknown time  Yes Yes   Sig: Take 10 mg by mouth daily      Facility-Administered Medications: None     Allergies   Allergies   Allergen Reactions     Kiwi Other (See Comments)     \"my mouth bleeds\"     Neurontin [Gabapentin] Other (See Comments)     Memory loss     Pineapple Other (See Comments)     \"my throat swells\"     Seasonal Allergies Other (See Comments)     hayfever symptoms       Social History   I have reviewed this patient's social history and updated it with pertinent information if needed. Tiffany L Will  reports that she has never smoked. She has never used smokeless tobacco. She reports that she drinks alcohol. She reports that she does not use illicit drugs.    Family History   I have reviewed this patient's family history and updated it with pertinent information if needed.   History reviewed. No pertinent family history.  Family " history of addiction  Yes sister with stimulant. Has gone through treatment     Review of Systems   The 10 point Review of Systems is negative other than noted in the HPI or here.   Denies Bowel or bladder dysfunction    Physical Exam   Temp:  [95.4  F (35.2  C)-98.1  F (36.7  C)] 95.4  F (35.2  C)  Heart Rate:  [] 91  Resp:  [7-27] 17  BP: (110-172)/() 172/83  FiO2 (%):  [100 %] 100 %  SpO2:  [93 %-100 %] 93 %  249 lbs 0 oz  GEN:  Alert, oriented x 3, appears comfortable, mild to moderate distress.  HEENT:  Normocephalic/atraumatic, no scleral icterus, no nasal discharge, mouth moist.  CV:  RRR, S1, S2; no murmurs or other irregularities noted.  +3 DP/PT pulses bilaterally; no edema bilateral lower extremeties.  RESP:  Clear to auscultation bilaterally without rales/rhonchi/wheezing/retractions.  Symmetric chest rise on inhalation noted.  Normal respiratory effort.  ABD:  Rounded, soft, non-tender/non-distended.  +BS  EXT:  Edema & pulses as noted above.  Color, moisture and sensation intact x 4.     M/S:   Mildly Tender to palpation  Over dressing and paralumbar .  ADLER X $  SKIN:  Dry to touch, no exanthems noted in the visualized areas.  Dressing with fersh blood,marked   NEURO: Symmetric strength +5/5.  except LLE +4/5 dorsiflexion and plantar flexion. Quad strentgh 5/5 bilaterally. Sensation to touch intact all extremities.   There is no area of allodynia or hyperesthesia.  PAIN BEHAVIOR: Cooperative  Psych:  Normal affect.  Calm, cooperative, conversant appropriately.       Data   Results for orders placed or performed during the hospital encounter of 08/09/18 (from the past 24 hour(s))   HCG qualitative urine   Result Value Ref Range    HCG Qual Urine Negative NEG^Negative   ABO/Rh type and screen   Result Value Ref Range    ABO A     RH(D) Pos     Antibody Screen Neg     Test Valid Only At [facility]        Specimen Expires 08/12/2018    XR Surgery NIDHI L/T 5 Min Fluoro w Stills     Narrative    This exam was marked as non-reportable because it will not be read by a   radiologist or a [place] non-radiologist provider.             Hospitalist IP Consult: Patient to be seen: Routine - within 24 hours; Hospitalist Consult; Consultant may enter orders: Yes    Fidel    Alvin SantiagoDO     8/9/2018  4:05 PM  [facility]  Hospitalist Consult Note    Name: Tiffany Solis      MRN: [id number]  YOB: 1980    Age: 37 year old  Date of admission: 8/9/2018  Primary care provider: Sandra Bender     Requesting Physician:  Dr Jenkins  Reason for consultation:  Post-operative medical management         Assessment and Plan:   Tiffany Solis is a 37 year old female with a history of   depression, migraines, and lumbar spine disease who was admitted   for lumbar spine fusion.    1. Lumbar fusion on 8/9: The patient is doing well, currently has   well controlled pain and is hemodynamically stable. The   hospitalist service will defer diet, activity, DVT prophylaxis,   and pain control to the surgical service. Currently the patient   is on PCDs for DVT prophylaxis. We agree with continued physical   and occupational therapy. Social work may be consulted for   possible placement needs. Please continue incentive spirometry   and check routine follow up hemoglobin to evaluate for surgical   blood loss and the potential need for transfusion.     2. Depression and migraines: Stable.  Resumed PTA medications.    3.  Dermatitis: Resumed 5 day course of prednisone as already   approved by spine surgery.      Code status: FULL  Prophylaxis: Currently on PCDs, ultimately deferred to the   primary service.  Disposition: Deferred to the primary service    Thank you for the consultation, we will continue to follow along   during the hospitalization. Please page with any questions or   concerns.         History of Present Illness:   Tiffany Solis is a 37 year old female who is  "being   hospitalized for lumbar fusion. Pre-operative note was fully   reviewed and recommendations acknowledged. Op note and anesthesia   notes and flow sheets were also reviewed.     The patient had no complications related to the procedure and has   had an unremarkable post-operative course to this point.   Currently pain is adequately controlled. No nausea, vomiting,   diarrhea or constipation. No fevers, chills, diaphoresis. No   chest pain, palpitations, dyspnea. No excessive somnolence and   patient is fully alert and oriented. The patient has no other   complaints at this time.                Past Medical History:     Past Medical History:   Diagnosis Date     Depression     & anxiety     Low back pain     & radiates down both legs (mainly left leg).  Also, has numbness   & tingling down both legs.     Migraine              Past Surgical History:     Past Surgical History:   Procedure Laterality Date     BACK SURGERY      cervical fusion x 7     ENT SURGERY      Tonsillectomy     GYN SURGERY      tubal ligation               Social History:     Social History   Substance Use Topics     Smoking status: Never Smoker     Smokeless tobacco: Never Used     Alcohol use Yes      Comment: rare             Family History:   Family history was fully reviewed and non-contributory in this   case.          Allergies:     Allergies   Allergen Reactions     Kiwi Other (See Comments)     \"my mouth bleeds\"     Neurontin [Gabapentin] Other (See Comments)     Memory loss     Pineapple Other (See Comments)     \"my throat swells\"     Seasonal Allergies Other (See Comments)     hayfever symptoms             Medications:     Prior to Admission medications    Medication Sig Last Dose Taking? Auth Provider   calcium carbonate (OS-RAVI 500 MG [place]. [place]) 500 MG tablet Take 1   tablet by mouth 2 times daily 8/2/2018 Yes Reported, Patient   HYDROcodone-acetaminophen (NORCO)  MG per tablet Take 1   tablet by mouth every 4 hours as " "needed for severe pain 8/9/2018   at Unknown time Yes Reported, Patient   loratadine (CLARITIN) 10 MG tablet Take 10 mg by mouth daily   8/9/2018 at Unknown time Yes Reported, Patient   METHOCARBAMOL PO Take 500 mg by mouth 3 times daily as needed for   muscle spasms Past Week at Unknown time Yes Reported, Patient   PREDNISONE PO Take 20 mg by mouth daily 8/9/2018 at Unknown time   Yes Reported, Patient   SERTRALINE HCL PO Take 100 mg by mouth daily 8/9/2018 at Unknown   time Yes Reported, Patient   TOPIRAMATE PO Take 50 mg by mouth daily 8/9/2018 at Unknown time   Yes Reported, Patient   VITAMIN D, CHOLECALCIFEROL, PO Take 3,000 Units by mouth daily   8/2/2018 Yes Reported, Patient   IBUPROFEN PO Take 400 mg by mouth every 6 hours as needed for   moderate pain 8/2/2018  Reported, Patient       Current hospital administered medication list (MAR) also   reviewed.          Review of Systems:   A comprehensive greater than 10 system review of systems was   carried out.  Pertinent positives and negatives are noted above.    Otherwise negative for contributory info.            Physical Exam:   Blood pressure 115/65, temperature 98.1  F (36.7  C), temperature   source Temporal, resp. rate 14, height 1.549 m (5' 1\"), weight   112.9 kg (249 lb), last menstrual period 07/02/2018, SpO2 96 %.  Exam:  GENERAL: No apparent distress. Awake, alert, and fully oriented.  HEENT: Normocephalic, atraumatic. Extraocular movements intact.  CARDIOVASCULAR: Regular rate and rhythm without murmurs or rubs.   No S3.  PULMONARY: Clear bilaterally.  ABDOMINAL: Soft, non-tender, non-distended. Bowel sounds   normoactive. No hepatosplenomegaly.  EXTREMITIES: No cyanosis or clubbing. No edema.  NEUROLOGICAL: CN 2-12 grossly intact, no focal neurological   deficits.  DERMATOLOGICAL: No rash, ulcer, ecchymoses, jaundice.         Data:   EKG/Telemetry:  Personally reviewed available data.    Laboratory: Personally reviewed available data.  No " results for input(s): WBC, HGB, HCT, MCV, PLT in the last 168   hours.  No results for input(s): NA, POTASSIUM, CHLORIDE, CO2, ANIONGAP,   GLC, BUN, CR, GFRESTIMATED, GFRESTBLACK, RAVI in the last 168   hours.  No results for input(s): CULT in the last 168 hours.    Imaging: Personally reviewed available data.  Recent Results (from the past 24 hour(s))   XR Surgery NIDHI L/T 5 Min Fluoro w Stills    Narrative    This exam was marked as non-reportable because it will not be   read by a   radiologist or a [place] non-radiologist provider.                   Alvni Santiago DO MPH  [facility] Hospitalist  [address] [place] [address]  Pager: [phone]  08/09/2018

## 2018-08-09 NOTE — ANESTHESIA POSTPROCEDURE EVALUATION
Patient: Tiffany Solis    Procedure(s):  L5 to S1 Oblique lumbar interbody fusion - Wound Class: I-Clean    Diagnosis:DDD, HNP  Diagnosis Additional Information: PRE-PROCEDURE DIAGNOSIS:  1) L5/S1 degenerative disc disease.  2)BMI 47 Obesety  POST-PROCEDURE DIAGNOSIS:  1) Same as above  PROCEDURE PERFORMED:  1)  L5/S1 oblique lateral lumbar interbody fusion with discectomy, preparation of the endplate and anusha, cement of a bullet cage packed with calcium triphosphate soaked in bone marrow anterior to the transverse process in modified prone position, with intraoperative biplanar fluoroscopic imaging and electrophysiological monitoring.  2)  L5/S1 Posterior , minimally invasive pedicle screw placement and posterolateral instrumentation and fusion with  intraoperative biplanar fluoroscopic imaging and electrophysiological monitoring.  3) Epidural steroid injection.  4) Transpedicular Bone marrow aspiration,   5) Injection of 0.75 marcain  in paracvertebral tissue for post op pain management    Anesthesia Type:  General, ETT    Note:  Anesthesia Post Evaluation    Patient location during evaluation: PACU  Patient participation: Able to fully participate in evaluation  Level of consciousness: awake  Pain management: adequate  Airway patency: patent  Cardiovascular status: acceptable  Respiratory status: acceptable  Hydration status: acceptable  PONV: controlled     Anesthetic complications: None          Last vitals:  Vitals:    08/09/18 1330 08/09/18 1345 08/09/18 1415   BP: (!) 156/97 154/89 (!) 135/104   Resp: 10 14 22   Temp:      SpO2: 98% 96% 98%         Electronically Signed By: Joe Esteban MD  August 9, 2018  2:36 PM

## 2018-08-09 NOTE — IP AVS SNAPSHOT
MRN:[id number]                      After Visit Summary   8/9/2018    Tiffany Solis    MRN: [id number]           Thank you!     Thank you for choosing [facility] for your care. Our goal is always to provide you with excellent care. Hearing back from our patients is one way we can continue to improve our services. Please take a few minutes to complete the written survey that you may receive in the mail after you visit. If you would like to speak to someone directly about your visit please contact Patient Relations at [phone]. Thank you!          Patient Information     Date Of Birth          1980        Designated Caregiver       Most Recent Value    Caregiver    Will someone help with your care after discharge? yes    Caregiver address same as patient      About your hospital stay     You were admitted on:  August 9, 2018 You last received care in the:  [facility]    You were discharged on:  August 11, 2018        Reason for your hospital stay       Spine surgery                  Who to Call     For medical emergencies, please call 911.  For non-urgent questions about your medical care, please call your primary care provider or clinic, [phone]  For questions related to your surgery, please call your surgery clinic        Attending Provider     Provider Specialty    Thu Delgado MD Neurosurgery       Primary Care Provider Office Phone # Fax #    [facility] [phone]      After Care Instructions     Activity       Your activity upon discharge: Ad danita within following limitations:  No excessive activities   No Bending, Twisting, climbing, Crawling,   No lifting more than 8 lb for 2 weeks, or 15 lb for 2 months or 25 lb for 4 months or 35 lb for 6 months  Brace for riding cars for 4-6 months            Diet       Follow this diet upon discharge: resume prior to admission diet            Discharge Instructions       Refer to [facility]  "spine handbood or online at [email or web address]  Or print it for patient at [email or web address]                  Follow-up Appointments     Follow-up and recommended labs and tests        Follow up in 2 weeks with me or PCP for wound check ( patient's choice) if patients want to go to PCP for wound check, then f/u in my office  With one month                  Additional Services     Physical Therapy Referral       *This therapy referral will be filtered to a centralized scheduling office at [facility] and the patient will receive a call to schedule an appointment at a [facility] location most convenient for them. *     [facility] provides Physical Therapy evaluation and treatment and many specialty services across the [facility] system.  If requesting a specialty program, please choose from the list below.    If you have not heard from the scheduling office within 2 business days, please call [phone] for all locations, with the exception of [place], please call [phone] and [facility], please call [phone]  Treatment: Evaluation & Treatment  Post spine surgedry evaluate and treat     Please be aware that coverage of these services is subject to the terms and limitations of your health insurance plan.  Call member services at your health plan with any benefit or coverage questions.      **Note to Provider:  If you are referring outside of [facility] for the therapy appointment, please list the name of the location in the \"special instructions\" above, print the referral and give to the patient to schedule the appointment.                  Further instructions from your care team       Opioid Medication Information    You have been given a prescription for an opioid (narcotic) pain medicine and/or have received a pain medicine. These medicines can make you drowsy or impaired. You must not drive, " operate dangerous equipment, or engage in any other dangerous activities while taking these medications. If you drive while taking these medications, you could be arrested for DUI, or driving under the influence. Do not drink any alcohol while you are taking these medications.   Opioid pain medications can cause addiction. If you have a history of chemical dependency of any type, you are at a higher risk of becoming addicted to pain medications.  Only take these prescribed medications to treat your pain when all other options have been tried. Take it for as short a time and as few doses as possible. Store your pain pills in a secure place, as they are frequently stolen and provide a dangerous opportunity for children or visitors in your house to start abusing these powerful medications. We will not replace any lost or stolen medicine.  As soon as your pain is better, you should seek out a drug take back program (see your local police department) to dispose of them.   Over-the-counter medications and prescription drugs can pollute seth and be harmful to humans, fish, and other wildlife when disposed of improperly -- do not flush medications down the toilet or place in the trash.  Properly disposing of medicines is important to prevent abuse or poisoning and protect the environment.     Prescription and over-the-counter medications are collected anonymously from residents for free at [facility] drop-off locations. Visit the [facility]'s Office Prescription Drug Drop-Off page for the list of drop-off sites and information about the program.       [place]  Police Department [phone] Mon-Fri  8am to 4:30pm     [place]  Police Department [phone] 24hrs a day    [place]  Police Department [phone] Mon-Fri  8am to 6:00pm     [place]  Police Department [phone] Mon-Fri  8am to 4:30pm     [place]  Police Department [phone] 24hrs a day      [place]  Police  Department [phone] Mon-Fri  8am to 4:30pm   Many prescription pain medications contain Tylenol  (acetaminophen), including Vicodin , Tylenol #3 , Norco , Lortab , and Percocet .  You should not take any extra pills of Tylenol  if you are using these prescription medications or you can get very sick.  Do not ever take more than 3000 mg of acetaminophen in any 24 hour period.  All opioids tend to cause constipation. Drink plenty of water and eat foods that have a lot of fiber, such as fruits, vegetables, prune juice, apple juice and high fiber cereal.  Take a laxative if you don t move your bowels at least every other day. Miralax , Milk of Magnesia, Colace , or Senna  can be used to keep you regular.  You will likely need to continue stool softeners and stimulants while taking opioids.     Naloxone Prescription  It has been determined you are at high risk of overdose of opioids as{Opioid Overdose Risk Factors:059205}.  You have been prescribed intranasal naloxone which is a medication that can temporarily reverse the effects of opioid pain medications if administered by a friend and family member while waiting for EMS to arrive.      The following video has been shown to you in the hospital, it recommend that you share it with your close family or friends in case you need assistance in an emergency.  http://prescribetoprevent.org/patient-education/videos/     You have been given the following information on Opioid overdose prevention and opioid safety https://store.[facility].gov/shin/content//CFA59-0075/safety-advice-for-patients-family-members.pdf      Pending Results     No orders found from 8/7/2018 to 8/10/2018.            Statement of Approval     Ordered          08/10/18 0814  I have reviewed and agree with all the recommendations and orders detailed in this document.  EFFECTIVE NOW     Approved and electronically signed by:  Thu Delgado MD             Admission Information     Date & Time Provider  "Department Dept. Phone    2018 Thu Delgado MD [facility] [phone]      Your Vitals Were     Blood Pressure Pulse Temperature Respirations Height Weight    126/82 82 97  F (36.1  C) 16 1.549 m (5' 1\") 112.9 kg (249 lb)    Last Period Pulse Oximetry BMI (Body Mass Index)             2018 94% 47.05 kg/m2         [employer] Information     [employer] lets you send messages to your doctor, view your test results, renew your prescriptions, schedule appointments and more. To sign up, go to [email or web address] . Click on \"Log in\" on the left side of the screen, which will take you to the Welcome page. Then click on \"Sign up Now\" on the right side of the page.     You will be asked to enter the access code listed below, as well as some personal information. Please follow the directions to create your username and password.     Your access code is: [email or web address]  Expires: 2018 12:45 PM     Your access code will  in 90 days. If you need help or a new code, please call your [place] clinic or [phone].        Care EveryWhere ID     This is your Care EveryWhere ID. This could be used by other organizations to access your [place] medical records  [id number]        Equal Access to Services     DEN[place] GAMINO AH: Jesus Alvarez, waaxda lugomez, qaybta kaalirene barr. So [facility] [phone].    ATENCIÓN: Si habla español, tiene a mason disposición servicios gratuitos de asistencia lingüística. Ronny al [phone].    We comply with applicable federal civil rights laws and [place] laws. We do not discriminate on the basis of race, color, national origin, age, disability, sex, sexual orientation, or gender identity.               Review of your medicines      START taking        Dose / Directions    hydrOXYzine 25 MG tablet   Commonly known as:  ATARAX        Dose:  25-50 mg   Take 1-2 tablets (25-50 mg) by mouth every 6 hours "   Quantity:  120 tablet   Refills:  3       naloxone nasal spray   Commonly known as:  NARCAN        Dose:  4 mg   Spray 1 spray (4 mg) into one nostril alternating nostrils as needed for opioid reversal every 2-3 minutes until assistance arrives   Quantity:  0.2 mL   Refills:  0       oxyCODONE IR 10 MG tablet   Commonly known as:  ROXICODONE        Dose:  10-20 mg   Take 1-2 tablets (10-20 mg) by mouth every 3 hours as needed for moderate to severe pain   Quantity:  60 tablet   Refills:  0         CONTINUE these medicines which have NOT CHANGED        Dose / Directions    calcium carbonate 500 MG tablet   Commonly known as:  OS-RAVI 500 mg [place]. Ca        Dose:  1 tablet   Take 1 tablet by mouth 2 times daily   Refills:  0       HYDROcodone-acetaminophen  MG per tablet   Commonly known as:  NORCO        Dose:  1 tablet   Take 1 tablet by mouth every 4 hours as needed for severe pain   Refills:  0       IBUPROFEN PO        Dose:  400 mg   Take 400 mg by mouth every 6 hours as needed for moderate pain   Refills:  0       loratadine 10 MG tablet   Commonly known as:  CLARITIN        Dose:  10 mg   Take 10 mg by mouth daily   Refills:  0       METHOCARBAMOL PO        Dose:  500 mg   Take 500 mg by mouth 3 times daily as needed for muscle spasms   Refills:  0       PREDNISONE PO        Dose:  20 mg   Take 20 mg by mouth daily   Refills:  0       SERTRALINE HCL PO        Dose:  100 mg   Take 100 mg by mouth daily   Refills:  0       TOPIRAMATE PO        Dose:  50 mg   Take 50 mg by mouth daily   Refills:  0       VITAMIN D (CHOLECALCIFEROL) PO        Dose:  3000 Units   Take 3,000 Units by mouth daily   Refills:  0            Where to get your medicines      These medications were sent to [facility] [address] [address] [address]     Phone:  [phone]     hydrOXYzine 25 MG tablet    naloxone nasal spray         Some of these will need a  paper prescription and others can be bought over the counter. Ask your nurse if you have questions.     Bring a paper prescription for each of these medications     oxyCODONE IR 10 MG tablet                Protect others around you: Learn how to safely use, store and throw away your medicines at www.disposemymeds.org.        Information about OPIOIDS     PRESCRIPTION OPIOIDS: WHAT YOU NEED TO KNOW   We gave you an opioid (narcotic) pain medicine. It is important to manage your pain, but opioids are not always the best choice. You should first try all the other options your care team gave you. Take this medicine for as short a time (and as few doses) as possible.    Some activities can increase your pain, such as bandage changes or therapy sessions. It may help to take your pain medicine 30 to 60 minutes before these activities. Reduce your stress by getting enough sleep, working on hobbies you enjoy and practicing relaxation or meditation. Talk to your care team about ways to manage your pain beyond prescription opioids.    These medicines have risks:    DO NOT drive when on new or higher doses of pain medicine. These medicines can affect your alertness and reaction times, and you could be arrested for driving under the influence (DUI). If you need to use opioids long-term, talk to your care team about driving.    DO NOT operate heavy machinery    DO NOT do any other dangerous activities while taking these medicines.    DO NOT drink any alcohol while taking these medicines.     If the opioid prescribed includes acetaminophen, DO NOT take with any other medicines that contain acetaminophen. Read all labels carefully. Look for the word  acetaminophen  or  Tylenol.  Ask your pharmacist if you have questions or are unsure.    You can get addicted to pain medicines, especially if you have a history of addiction (chemical, alcohol or substance dependence). Talk to your care team about ways to reduce this risk.    All  opioids tend to cause constipation. Drink plenty of water and eat foods that have a lot of fiber, such as fruits, vegetables, prune juice, apple juice and high-fiber cereal. Take a laxative (Miralax, milk of magnesia, Colace, Senna) if you don t move your bowels at least every other day. Other side effects include upset stomach, sleepiness, dizziness, throwing up, tolerance (needing more of the medicine to have the same effect), physical dependence and slowed breathing.    Store your pills in a secure place, locked if possible. We will not replace any lost or stolen medicine. If you don t finish your medicine, please throw away (dispose) as directed by your pharmacist. The [place] Pollution Control Agency has more information about safe disposal: https://www.pca.[facility].[place].us/living-green/managing-unwanted-medications             Medication List: This is a list of all your medications and when to take them. Check marks below indicate your daily home schedule. Keep this list as a reference.      Medications           Morning Afternoon Evening Bedtime As Needed    calcium carbonate 500 MG tablet   Commonly known as:  OS-RAVI 500 mg [place]. Ca   Take 1 tablet by mouth 2 times daily                                HYDROcodone-acetaminophen  MG per tablet   Commonly known as:  NORCO   Take 1 tablet by mouth every 4 hours as needed for severe pain                                hydrOXYzine 25 MG tablet   Commonly known as:  ATARAX   Take 1-2 tablets (25-50 mg) by mouth every 6 hours   Last time this was given:  50 mg on 8/11/2018 10:05 AM                                IBUPROFEN PO   Take 400 mg by mouth every 6 hours as needed for moderate pain   Last time this was given:  800 mg on 8/11/2018  8:30 AM                                loratadine 10 MG tablet   Commonly known as:  CLARITIN   Take 10 mg by mouth daily   Last time this was given:  10 mg on 8/11/2018  8:30 AM                                METHOCARBAMOL PO    Take 500 mg by mouth 3 times daily as needed for muscle spasms   Last time this was given:  500 mg on 8/10/2018  7:51 AM                                naloxone nasal spray   Commonly known as:  NARCAN   Spray 1 spray (4 mg) into one nostril alternating nostrils as needed for opioid reversal every 2-3 minutes until assistance arrives                                oxyCODONE IR 10 MG tablet   Commonly known as:  ROXICODONE   Take 1-2 tablets (10-20 mg) by mouth every 3 hours as needed for moderate to severe pain   Last time this was given:  20 mg on 8/11/2018 12:16 PM                                PREDNISONE PO   Take 20 mg by mouth daily   Last time this was given:  20 mg on 8/11/2018  8:31 AM                                SERTRALINE HCL PO   Take 100 mg by mouth daily   Last time this was given:  100 mg on 8/11/2018  8:30 AM                                TOPIRAMATE PO   Take 50 mg by mouth daily   Last time this was given:  75 mg on 8/11/2018  8:30 AM                                VITAMIN D (CHOLECALCIFEROL) PO   Take 3,000 Units by mouth daily                                          More Information      After Your Surgery  Preoperative Assessment Center  Pain control  After surgery, you may have pain. Our goal is to help you manage your pain.    We may give you a pain control pump that will:  ? Deliver the medicine through a tube placed in your vein.  ? Control the amount of medicine you receive.  ? Allow you to push a button to deliver a dose of pain medicine.    Pain medicine will help you feel comfortable enough to do activities that will help you heal. These may include breathing exercises, walking and physical therapy.  Pneumo boots  You may need to wear pneumo boots on your legs and feet. These are wraps connected to a machine that pumps in air and releases it. The repeated pumping helps prevent blood clots from forming.  Breathing exercises  Breathing exercises help you recover faster. Take deep  breaths and let the air out slowly. This will:    Help you relax.    Fill your lungs with air and get them working.    Help prevent complications like pneumonia.  We will give you a breathing device (incentive spirometer) to help you breathe deeply. Please see the handout on the incentive spirometer.  Nausea and vomiting  You may feel sick to your stomach from the surgery or from the medicine that put you to sleep. If so, let your nurse know. We can give you medicine to help you feel better.  For informational purposes only. Not to replace the advice of your health care provider.  Copyright   June 2010 [employer]. All rights reserved. [employer] 550300 - REV 03/16.

## 2018-08-10 ENCOUNTER — APPOINTMENT (OUTPATIENT)
Dept: PHYSICAL THERAPY | Facility: CLINIC | Age: 38
DRG: 454 | End: 2018-08-10
Attending: NEUROLOGICAL SURGERY
Payer: MEDICARE

## 2018-08-10 LAB
ANION GAP SERPL CALCULATED.3IONS-SCNC: 6 MMOL/L (ref 3–14)
BASOPHILS # BLD AUTO: 0 10E9/L (ref 0–0.2)
BASOPHILS NFR BLD AUTO: 0.2 %
BUN SERPL-MCNC: 13 MG/DL (ref 7–30)
CALCIUM SERPL-MCNC: 8.9 MG/DL (ref 8.5–10.1)
CHLORIDE SERPL-SCNC: 107 MMOL/L (ref 94–109)
CO2 SERPL-SCNC: 25 MMOL/L (ref 20–32)
CREAT SERPL-MCNC: 0.63 MG/DL (ref 0.52–1.04)
DIFFERENTIAL METHOD BLD: ABNORMAL
EOSINOPHIL # BLD AUTO: 0 10E9/L (ref 0–0.7)
EOSINOPHIL NFR BLD AUTO: 0.2 %
ERYTHROCYTE [DISTWIDTH] IN BLOOD BY AUTOMATED COUNT: 13.2 % (ref 10–15)
GFR SERPL CREATININE-BSD FRML MDRD: >90 ML/MIN/1.7M2
GLUCOSE SERPL-MCNC: 113 MG/DL (ref 70–99)
HCT VFR BLD AUTO: 35.4 % (ref 35–47)
HGB BLD-MCNC: 11.2 G/DL (ref 11.7–15.7)
IMM GRANULOCYTES # BLD: 0.1 10E9/L (ref 0–0.4)
IMM GRANULOCYTES NFR BLD: 0.5 %
INTERPRETATION ECG - MUSE: NORMAL
LYMPHOCYTES # BLD AUTO: 2 10E9/L (ref 0.8–5.3)
LYMPHOCYTES NFR BLD AUTO: 16.2 %
MCH RBC QN AUTO: 28.6 PG (ref 26.5–33)
MCHC RBC AUTO-ENTMCNC: 31.6 G/DL (ref 31.5–36.5)
MCV RBC AUTO: 91 FL (ref 78–100)
MONOCYTES # BLD AUTO: 1 10E9/L (ref 0–1.3)
MONOCYTES NFR BLD AUTO: 8 %
NEUTROPHILS # BLD AUTO: 9 10E9/L (ref 1.6–8.3)
NEUTROPHILS NFR BLD AUTO: 74.9 %
NRBC # BLD AUTO: 0 10*3/UL
NRBC BLD AUTO-RTO: 0 /100
PLATELET # BLD AUTO: 346 10E9/L (ref 150–450)
POTASSIUM SERPL-SCNC: 4.1 MMOL/L (ref 3.4–5.3)
RBC # BLD AUTO: 3.91 10E12/L (ref 3.8–5.2)
SODIUM SERPL-SCNC: 138 MMOL/L (ref 133–144)
WBC # BLD AUTO: 12 10E9/L (ref 4–11)

## 2018-08-10 PROCEDURE — 12000000 ZZH R&B MED SURG/OB

## 2018-08-10 PROCEDURE — A9270 NON-COVERED ITEM OR SERVICE: HCPCS | Mod: GY | Performed by: NURSE PRACTITIONER

## 2018-08-10 PROCEDURE — 40000193 ZZH STATISTIC PT WARD VISIT: Performed by: PHYSICAL THERAPIST

## 2018-08-10 PROCEDURE — 25000132 ZZH RX MED GY IP 250 OP 250 PS 637: Mod: GY | Performed by: NURSE PRACTITIONER

## 2018-08-10 PROCEDURE — 36415 COLL VENOUS BLD VENIPUNCTURE: CPT | Performed by: NEUROLOGICAL SURGERY

## 2018-08-10 PROCEDURE — A9270 NON-COVERED ITEM OR SERVICE: HCPCS | Mod: GY | Performed by: NEUROLOGICAL SURGERY

## 2018-08-10 PROCEDURE — 97530 THERAPEUTIC ACTIVITIES: CPT | Mod: GP

## 2018-08-10 PROCEDURE — 99207 ZZC CDG-MDM COMPONENT: MEETS MODERATE - UP CODED: CPT | Performed by: HOSPITALIST

## 2018-08-10 PROCEDURE — A9270 NON-COVERED ITEM OR SERVICE: HCPCS | Mod: GY | Performed by: HOSPITALIST

## 2018-08-10 PROCEDURE — 99232 SBSQ HOSP IP/OBS MODERATE 35: CPT | Performed by: HOSPITALIST

## 2018-08-10 PROCEDURE — 99231 SBSQ HOSP IP/OBS SF/LOW 25: CPT | Performed by: NURSE PRACTITIONER

## 2018-08-10 PROCEDURE — 25000132 ZZH RX MED GY IP 250 OP 250 PS 637: Mod: GY | Performed by: HOSPITALIST

## 2018-08-10 PROCEDURE — 97116 GAIT TRAINING THERAPY: CPT | Mod: GP | Performed by: PHYSICAL THERAPIST

## 2018-08-10 PROCEDURE — 25000125 ZZHC RX 250: Performed by: NEUROLOGICAL SURGERY

## 2018-08-10 PROCEDURE — 97530 THERAPEUTIC ACTIVITIES: CPT | Mod: GP | Performed by: PHYSICAL THERAPIST

## 2018-08-10 PROCEDURE — 40000193 ZZH STATISTIC PT WARD VISIT

## 2018-08-10 PROCEDURE — 25000128 H RX IP 250 OP 636: Performed by: NEUROLOGICAL SURGERY

## 2018-08-10 PROCEDURE — 85025 COMPLETE CBC W/AUTO DIFF WBC: CPT | Performed by: NEUROLOGICAL SURGERY

## 2018-08-10 PROCEDURE — 80048 BASIC METABOLIC PNL TOTAL CA: CPT | Performed by: NEUROLOGICAL SURGERY

## 2018-08-10 PROCEDURE — 25000132 ZZH RX MED GY IP 250 OP 250 PS 637: Mod: GY | Performed by: NEUROLOGICAL SURGERY

## 2018-08-10 PROCEDURE — 97116 GAIT TRAINING THERAPY: CPT | Mod: GP

## 2018-08-10 PROCEDURE — 97161 PT EVAL LOW COMPLEX 20 MIN: CPT | Mod: GP | Performed by: PHYSICAL THERAPIST

## 2018-08-10 RX ORDER — TOPIRAMATE 25 MG/1
75 TABLET, FILM COATED ORAL DAILY
Status: DISCONTINUED | OUTPATIENT
Start: 2018-08-11 | End: 2018-08-11 | Stop reason: HOSPADM

## 2018-08-10 RX ORDER — IBUPROFEN 800 MG/1
800 TABLET, FILM COATED ORAL 3 TIMES DAILY
Status: DISCONTINUED | OUTPATIENT
Start: 2018-08-10 | End: 2018-08-11 | Stop reason: HOSPADM

## 2018-08-10 RX ORDER — TOPIRAMATE 25 MG/1
25 TABLET, FILM COATED ORAL ONCE
Status: COMPLETED | OUTPATIENT
Start: 2018-08-10 | End: 2018-08-10

## 2018-08-10 RX ORDER — OXYCODONE HYDROCHLORIDE 10 MG/1
10-20 TABLET ORAL
Qty: 60 TABLET | Refills: 0 | Status: SHIPPED | OUTPATIENT
Start: 2018-08-10

## 2018-08-10 RX ORDER — HYDROXYZINE HYDROCHLORIDE 25 MG/1
25-50 TABLET, FILM COATED ORAL EVERY 6 HOURS
Qty: 120 TABLET | Refills: 3 | Status: SHIPPED | OUTPATIENT
Start: 2018-08-10

## 2018-08-10 RX ADMIN — OXYCODONE HYDROCHLORIDE 20 MG: 5 TABLET ORAL at 15:11

## 2018-08-10 RX ADMIN — KETOROLAC TROMETHAMINE 30 MG: 30 INJECTION, SOLUTION INTRAMUSCULAR at 05:26

## 2018-08-10 RX ADMIN — ACETAMINOPHEN 975 MG: 325 TABLET, FILM COATED ORAL at 16:08

## 2018-08-10 RX ADMIN — METHOCARBAMOL 500 MG: 500 TABLET ORAL at 07:51

## 2018-08-10 RX ADMIN — LORATADINE 10 MG: 10 TABLET ORAL at 07:51

## 2018-08-10 RX ADMIN — HYDROXYZINE HYDROCHLORIDE 50 MG: 25 TABLET ORAL at 16:09

## 2018-08-10 RX ADMIN — POTASSIUM CHLORIDE AND SODIUM CHLORIDE: 450; 150 INJECTION, SOLUTION INTRAVENOUS at 01:58

## 2018-08-10 RX ADMIN — HYDROXYZINE HYDROCHLORIDE 50 MG: 25 TABLET ORAL at 05:28

## 2018-08-10 RX ADMIN — OXYCODONE HYDROCHLORIDE 20 MG: 5 TABLET ORAL at 20:47

## 2018-08-10 RX ADMIN — OXYCODONE HYDROCHLORIDE 20 MG: 5 TABLET ORAL at 05:29

## 2018-08-10 RX ADMIN — TOPIRAMATE 25 MG: 25 TABLET, FILM COATED ORAL at 12:34

## 2018-08-10 RX ADMIN — OXYCODONE HYDROCHLORIDE 20 MG: 5 TABLET ORAL at 12:14

## 2018-08-10 RX ADMIN — PREDNISONE 20 MG: 20 TABLET ORAL at 07:51

## 2018-08-10 RX ADMIN — IBUPROFEN 800 MG: 800 TABLET ORAL at 19:24

## 2018-08-10 RX ADMIN — TOPIRAMATE 50 MG: 25 TABLET, FILM COATED ORAL at 07:51

## 2018-08-10 RX ADMIN — HYDROXYZINE HYDROCHLORIDE 25 MG: 25 TABLET ORAL at 21:48

## 2018-08-10 RX ADMIN — ACETAMINOPHEN 975 MG: 325 TABLET, FILM COATED ORAL at 07:51

## 2018-08-10 RX ADMIN — IBUPROFEN 800 MG: 800 TABLET ORAL at 14:08

## 2018-08-10 RX ADMIN — HYDROXYZINE HYDROCHLORIDE 25 MG: 25 TABLET ORAL at 09:42

## 2018-08-10 RX ADMIN — OXYCODONE HYDROCHLORIDE 20 MG: 5 TABLET ORAL at 08:31

## 2018-08-10 RX ADMIN — OXYCODONE HYDROCHLORIDE 20 MG: 5 TABLET ORAL at 01:59

## 2018-08-10 RX ADMIN — CEFAZOLIN SODIUM 1 G: 1 INJECTION, SOLUTION INTRAVENOUS at 01:59

## 2018-08-10 RX ADMIN — SERTRALINE HYDROCHLORIDE 100 MG: 100 TABLET ORAL at 07:51

## 2018-08-10 ASSESSMENT — ACTIVITIES OF DAILY LIVING (ADL)
AMBULATION: 1-->ASSISTIVE EQUIPMENT
RETIRED_COMMUNICATION: 0-->UNDERSTANDS/COMMUNICATES WITHOUT DIFFICULTY
ADLS_ACUITY_SCORE: 13
TOILETING: 0-->INDEPENDENT
BATHING: 0-->INDEPENDENT
ADLS_ACUITY_SCORE: 13
RETIRED_EATING: 0-->INDEPENDENT
ADLS_ACUITY_SCORE: 12
DRESS: 0-->INDEPENDENT
ADLS_ACUITY_SCORE: 13
COGNITION: 0 - NO COGNITION ISSUES REPORTED
TRANSFERRING: 1-->ASSISTIVE EQUIPMENT
ADLS_ACUITY_SCORE: 13
ADLS_ACUITY_SCORE: 13
SWALLOWING: 2-->DIFFICULTY SWALLOWING LIQUIDS
FALL_HISTORY_WITHIN_LAST_SIX_MONTHS: YES

## 2018-08-10 NOTE — PLAN OF CARE
Problem: Patient Care Overview  Goal: Plan of Care/Patient Progress Review  Outcome: Improving  A&O x4. VSS. LS CTA all fields. BS hypoactive, -flatus, roberto regular well. Dressing to spine had moderate amt of serosanguinous drainage requiring dressing reinforcement. Numbness to L great and 2nd toe, pt reports numbness to these toes prior to sugery but states it does feel different than before, Abassi aware. Numbness reports to R foot that is improving. Moderate D/P to BLE. up with A1 with brace and walker. IV toradol with scheduled atarax, tylenol and lidocaine patch with PRN oxycodone 20mg and robaxin managing pain. Pain team following. Capnography stable throughout night with 3LPM of O2 via NC. voiding in good amts via bowling catheter to be removed this AM. Will continue to monitor.

## 2018-08-10 NOTE — PROGRESS NOTES
[facility]    Hospitalist Progress Note  Name: Tiffany Solis    MRN: [id number]  Provider:  Alvin Santiago DO MPH  Date of Service: 08/10/2018    Summary of Stay: Tiffany Solis is a 37 year old female with a history of depression, migraines, and lumbar spine disease who was admitted for lumbar spine fusion.     1. Lumbar fusion on 8/9: The patient is doing well, currently has well controlled pain and is hemodynamically stable. The hospitalist service will defer diet, activity, DVT prophylaxis, and pain control to the surgical service. Currently the patient is on PCDs for DVT prophylaxis. We agree with continued physical and occupational therapy. Social work may be consulted for possible placement needs. Please continue incentive spirometry and check routine follow up hemoglobin to evaluate for surgical blood loss and the potential need for transfusion.      2. Depression and migraines: Stable.  Resumed PTA medications.     3. Dermatitis: Resumed 5 day course of prednisone as already approved by spine surgery.      4. Leukocytosis: Likely related to steroids, no fever nor infectious symptoms.    5. Acute blood loss anemia: Hemoglobin stable, monitor for now and no indication for transfusion.      Thank you for the consultation, we will continue to follow along during the hospitalization. Please page with any questions or concerns.    DVT Prophylaxis: Defer to primary service  Code Status: Prior  Disposition: Defer to primary service.   Incidental Findings: None.  Family updated today: Yes      Interval History   The patient reports doing well. Pain much improved. No chest pain or shortness of breath. No nausea, vomiting, diarrhea, constipation. No fevers. No other specific complaints identified.     -Data reviewed today: I personally reviewed all new labs and imaging results over the last 24 hours.     Physical Exam   Temp: 97.7  F (36.5  C) Temp src: Oral BP: 106/67 Pulse: 84 Heart Rate: 101 Resp: 13  SpO2: 93 % O2 Device: None (Room air) Oxygen Delivery: 3 LPM  Vitals:    08/02/18 1528 08/09/18 0822   Weight: 112.5 kg (248 lb) 112.9 kg (249 lb)     Vital Signs with Ranges  Temp:  [95.4  F (35.2  C)-98.5  F (36.9  C)] 97.7  F (36.5  C)  Pulse:  [84] 84  Heart Rate:  [] 101  Resp:  [7-27] 13  BP: (100-172)/() 106/67  FiO2 (%):  [100 %] 100 %  SpO2:  [93 %-100 %] 93 %  I/O last 3 completed shifts:  In: 2981 [P.O.:550; I.V.:2431]  Out: 2646 [Urine:2625; Blood:21]    GENERAL: No apparent distress. Awake, alert, and fully oriented.  HEENT: Normocephalic, atraumatic. Extraocular movements intact.  CARDIOVASCULAR: Regular rate and rhythm without murmurs or rubs. No S3.  PULMONARY: Clear bilaterally.  GASTROINTESTINAL: Soft, non-tender, non-distended. Bowel sounds normoactive.   EXTREMITIES: No cyanosis or clubbing. No edema.  NEUROLOGICAL: CN 2-12 grossly intact, no focal neurological deficits.  DERMATOLOGICAL: No rash, ulcer, bruising, nor jaundice.     Medications       acetaminophen  975 mg Oral Q8H     hydrOXYzine  25-50 mg Oral Q6H     ketorolac  30 mg Intravenous Q6H     lidocaine   Transdermal Q24H    And     lidocaine   Transdermal Q8H     loratadine  10 mg Oral Daily     predniSONE (DELTASONE) tablet 20 mg  20 mg Oral Daily     sertraline (ZOLOFT) tablet 100 mg  100 mg Oral Daily     sodium chloride (PF)  3 mL Intracatheter Q8H     topiramate (TOPAMAX) tablet 50 mg  50 mg Oral Daily     Data     Laboratory:    Recent Labs  Lab 08/10/18  0622   WBC 12.0*   HGB 11.2*   HCT 35.4   MCV 91          Recent Labs  Lab 08/10/18  0622      POTASSIUM 4.1   CHLORIDE 107   CO2 25   ANIONGAP 6   *   BUN 13   CR 0.63   GFRESTIMATED >90   GFRESTBLACK >90   RAVI 8.9     No results for input(s): CULT in the last 168 hours.    Imaging:  Recent Results (from the past 24 hour(s))   XR Surgery NIDHI L/T 5 Min Fluoro w Stills    Narrative    This exam was marked as non-reportable because it will not be  read by a   radiologist or a [place] non-radiologist provider.                   Alvin Santiago DO MPH  [facility]  [address] [place] [address]  Pager: [phone]  08/10/2018

## 2018-08-10 NOTE — PLAN OF CARE
Problem: Patient Care Overview  Goal: Plan of Care/Patient Progress Review  Patient alert and oriented.  Up with 1 assist/gait, walker, and abd binder.  Lung sounds clear.  Bowel sounds hypoactive.  Regular diet.  Pain managed with scheduled tylenol, atarax, ibuprofen, prn ice and oxycodone.  Left foot to calf area numbness, right foot toe numbness, otherwise CMS intact.  Dressing to back CDI.  Will continue to monitor.

## 2018-08-10 NOTE — PROGRESS NOTES
DAILY PROGRESS NOTE    Tiffany Solis is a 37 year old old female admitted on 8/9/2018  7:52 AM.    Subjective  Comfortable      Objective    AAOx3, ADLER , f/c 4/4   Ambulating,     Hemoglobin   Date Value Ref Range Status   08/10/2018 11.2 (L) 11.7 - 15.7 g/dL Final   ]      Impression / Plan       Plan for today:    Patient doing well  Ambulate with help  PT OT, possibly clearance   D/c dash this am  Full diet  Today   transition to PO meds today   If cleared by pt dc home today otherwise c home tomorrow

## 2018-08-10 NOTE — PLAN OF CARE
Problem: Patient Care Overview  Goal: Plan of Care/Patient Progress Review  PT: Orders received. PT evaluation completed and treatment initiated. Pt reports living in a house with 5 steps to enter, with all needs met on main level. Pt has been ambulating with a FWW in recent past secondary to LE numbness and back pain. Pt plans to stay with her mother for the first week after discharge who has a similar home set-up.     Discharge Planner PT   Patient plan for discharge: To mother's home  Current status: Pt completes sit<>supine with SBA, increased time, and cues for log roll. Pt completes sit<>stand with SBA, increased time, and cues for safe hand placement. Pt ambulates 120' with use of FWW and CGA with cues for walker management and posture. Pt notes L LE numbness from thighs to toes, and R toe numbness. LLE numbness is new-surgeon aware.   Barriers to return to prior living situation: Stairs to enter home  Recommendations for discharge: Home  Rationale for recommendations: Anticipate that with continued IPPT the pt will be able to complete all mobility skills required for safe discharge home.       Entered by: Sheree Spears 08/10/2018 9:48 AM

## 2018-08-10 NOTE — PROGRESS NOTES
08/10/18 0989   Quick Adds   Type of Visit Initial PT Evaluation   Living Environment   Lives With spouse;child(efrem), dependent   Living Arrangements house   Number of Stairs to Enter Home 5   Number of Stairs Within Home (Does not need to complete inside stairs)   Stair Railings at Home none   Transportation Available family or friend will provide   Living Environment Comment Pt plans to discharge to mother's home, which has a similar set up as above.    Self-Care   Dominant Hand right   Usual Activity Tolerance moderate   Current Activity Tolerance moderate   Regular Exercise no   Equipment Currently Used at Home walker, rolling   Activity/Exercise/Self-Care Comment Was using the walker recently secondary to LE numbness and back pain   Functional Level Prior   Ambulation 1-->assistive equipment   Transferring 1-->assistive equipment   Toileting 0-->independent   Bathing 0-->independent   Dressing 0-->independent   Eating 0-->independent   Fall history within last six months yes   Number of times patient has fallen within last six months 1   Which of the above functional risks had a recent onset or change? ambulation;transferring;toileting;bathing;dressing   General Information   Onset of Illness/Injury or Date of Surgery - Date 08/09/18   Referring Physician Thu Delgado MD   Patient/Family Goals Statement Spend ~1wk with mother, then home   Pertinent History of Current Problem (include personal factors and/or comorbidities that impact the POC) Tiffany Solis is a 37 year old female with a history of depression, migraines, and lumbar spine disease who was admitted for lumbar spine fusion.   Precautions/Limitations fall precautions;spinal precautions   Weight-Bearing Status - LLE full weight-bearing   Weight-Bearing Status - RLE full weight-bearing   General Observations Pt toileting with nursing upon initiation, agreeable to session.    Cognitive Status Examination   Orientation orientation to person, place  and time   Level of Consciousness alert   Follows Commands and Answers Questions 100% of the time   Personal Safety and Judgment intact   Memory intact   Pain Assessment   Patient Currently in Pain Yes, see Vital Sign flowsheet   Integumentary/Edema   Integumentary/Edema Comments Dressing intact   Posture    Posture Forward head position;Protracted shoulders   Range of Motion (ROM)   ROM Comment LE ROM WNL   Strength   Strength Comments Able to SLR B LE   Bed Mobility   Bed Mobility Comments sit<>supine with SBA   Transfer Skills   Transfer Comments SBA with FWW   Gait   Gait Comments CGA with FWW   Balance   Balance Comments Benefits from B UE support for safe dynamic mobility   Sensory Examination   Sensory Perception Comments L LE numbness from thigh down to toes, R toe numbness   Coordination   Coordination no deficits were identified   Muscle Tone   Muscle Tone no deficits were identified   Modality Interventions   Planned Modality Interventions Cryotherapy   Planned Modality Interventions Comments Ice PRN   General Therapy Interventions   Planned Therapy Interventions bed mobility training;gait training;strengthening;stretching;transfer training;home program guidelines;progressive activity/exercise   Clinical Impression   Criteria for Skilled Therapeutic Intervention yes, treatment indicated   PT Diagnosis Impaired functional mobility   Influenced by the following impairments Pain, weakness, LE numbness, spinal precautions   Functional limitations due to impairments Difficulty with bed mobility, transfers, ambulation, stairs   Clinical Presentation Stable/Uncomplicated   Clinical Presentation Rationale medically stable, progressing as expected post-op   Clinical Decision Making (Complexity) Low complexity   Therapy Frequency` 2 times/day   Predicted Duration of Therapy Intervention (days/wks) 3 days   Anticipated Equipment Needs at Discharge walker   Anticipated Discharge Disposition Home with Assist   Risk &  "Benefits of therapy have been explained Yes   Patient, Family & other staff in agreement with plan of care Yes   [facility] AM-PAC TM \"6 Clicks\"   2016, Trustees of [facility], under license to [employer].  All rights reserved.   6 Clicks Short Forms Basic Mobility Inpatient Short Form   [facility] AM-PAC  \"6 Clicks\" V.2 Basic Mobility Inpatient Short Form   1. Turning from your back to your side while in a flat bed without using bedrails? 4 - None   2. Moving from lying on your back to sitting on the side of a flat bed without using bedrails? 3 - A Little   3. Moving to and from a bed to a chair (including a wheelchair)? 3 - A Little   4. Standing up from a chair using your arms (e.g., wheelchair, or bedside chair)? 3 - A Little   5. To walk in hospital room? 3 - A Little   6. Climbing 3-5 steps with a railing? 3 - A Little   Basic Mobility Raw Score (Score out of 24.Lower scores equate to lower levels of function) 19   Total Evaluation Time   Total Evaluation Time (Minutes) 8     "

## 2018-08-10 NOTE — DISCHARGE SUMMARY
Discharge Summary    Attending Physician:  Thu Delgado MD  Admit Date: 8/9/2018    Discharge Date: 8/11/18  Primary Care Physician: Sandra Bender    Discharge Diagnoses  [unfilled]    Discharge Exam    AAOx3 ADLER f/c all for , no weakness, No new sensory deficit, incision C/D/I        Preliminary Discharge Medications    This list of medications is preliminary and tentative.  Please see the After Visit Summary for the final and accurate medication list.    [unfilled]    Procedures Performed and Findings  Procedure(s):  L5 to S1 Oblique lumbar interbody fusion - Wound Class: I-Clean       Consultations Obtained  HOSPITALIST IP CONSULT  OCCUPATIONAL THERAPY ADULT IP CONSULT  PHYSICAL THERAPY ADULT IP CONSULT  PAIN MANAGEMENT ADULT IP CONSULT  SOCIAL WORK IP CONSULT    Code Status   Full Code    Discharge Disposition        Diet on Discharge   Regular    Activity on Discharge   Your activity upon discharge: Ad danita within following limitations:  No excessive activities   No Bending, Twisting, climbing, Crawling,   No lifting more than 8 lb for 2 weeks, or 15 lb for 2 months or 25 lb for 4 months or 35 lb for 6 months  Brace for riding cars for 4-6 months      Discharge Instructions  Per [facility] instruction : [email or web address]        Follow-Up Scheduled    Follow up in 2 weeks with me or PCP for wound check ( patient's choice) if patients want to go to PCP for wound check, then f/u in my office  In 3 months      Hospital Course   Hospital Course unremarkable , adequate ambulation in due time, pain controlled , cleared by PT/OT, no events

## 2018-08-10 NOTE — PLAN OF CARE
Problem: Patient Care Overview  Goal: Plan of Care/Patient Progress Review    Discharge Planner PT   Patient plan for discharge: To mother's home  Current status: Patient seated in bedside chair upon arrival.  Patient reports continued left LE numbness.  Patient transferred to standing with 2WW and CGA.  Patient amb 200 feet with 2WW and step through gait patten, verbal cueing provided for upright posture and decreased reliance on UE support.  Patient negotiated 4 steps with 2 railings, CGA and direct verbal cueing for technique.  Performed with step to gait pattern leading with right LE, heavy reliance on UE support noted.  Upon return to room, patient transferred to supine through log roll with SBA.  Patient reported increased pain following stair negotiation.   Barriers to return to prior living situation: Stairs to enter home  Recommendations for discharge: Home  Rationale for recommendations: Anticipate that with continued IPPT the pt will be able to complete all mobility skills required for safe discharge home.       Entered by: Eunice Deluca 08/10/2018 3:57 PM

## 2018-08-10 NOTE — PROGRESS NOTES
[facility]  Pain Management Progress Note  Text Page     Assessment & Plan   Tiffany Solis is a 37 year old female who was admitted on 8/9/2018  1)  Acute on chronic  Pain s/p bi-level fusion L4-S1 and discectomy L4-5.POD 1. Continues with increase numbness left leg in operative distribution and right pedal digits     2)  Patient with chronic pain, on chronic opioid therapy managed by  Thu Rosales MD  Baseline 75-90 mg Daily Morphine Equivalent as dispensed as reported daily use.  Patient has a high opioid tolerance.      Patient's opioid use in past 24 hours:  Oxycodone 100 mg ( based on last 14 hours   = 171.4 mg Daily Morphine Equivalent     3)  Risk factors for opioid related harms  -High opioid dose (>50 MME/day)  -Anxiety/depression  -Opioid has recently been changed     4)  Opioid induced side-effects:  -Constipation no   -Nausea/Vomit no   -Sedation no   -Urinary Retention no     5)  Other/Related:    -Depression/anxiety   -Deconditioning    -obesity      PLAN:   1)  Educated on the normal trajectory of acute post op pain.    2)  Encouraged use opioid sparing medications   3)Non-opioid multimodal medication therapy  -Topical:Voltaren 1% Topical Gel tid and Lidocaine Patch 4%  Prn to neck and upper back  -N-SAIDS:D/c Ketorolac 50 mg every 6 hrs. Start Ibuprofen 800 mg Q 8hrs x 3 days  -Muscle Relaxants:Methocarbamol 500 mg tid prn as chronic  -Adjuvants:Acetaminophen 975 mg every 8 hrs x 3 day  No gabapentin or lyrica due to AE's  Increase  Topamax  25 am and 50 mg as migraine preventive for radicular pain. -Antidepresants/anxiolytics:Sertraline  100 mg as chronic  4)  Non-medication interventions  Positioning, ICE, Relaxation, Essential oils per nursing  5)  Opioids:change Oxycodone to 15-20 mg every 3 hrs prn, discontinue Dilaudid 0.2-0.4 mg IV every 3 hrs prn BTP only.   Encourage use of oral opioids over IV  Capnography continuous while asleep or at naps  Narcan as rescue for opioid  reversal  Opioids Treatment Goal: -Improvement in function  -Participate in PT  -Post-operative management of pain, expected 3-7 days needed  6)  Constipation Prophylaxis   Continue to Monitor, Bowel Meds PRN per Constipation Order Set, Senna-S prn   7)  Pain Education  -Opioid safe use, storage and disposal information included in DC AVS  8)  DC Planning   Discussed goal of Opioid therapy as above with patient and nurse   Recommend short supply of opioids only ( 3 days) per CDC guidelines for acute pain management.  Continued outpatient management of pain per  Temitope Fernández MD   Disposition: home with sister for one week  Support systems:  Sister   Outpatient Referrals: possible Physical Therapy   The following risk factors have been identified for unintentional overdose: patient is overweight, patient is taking a high amount of opioids in 24 hour period and patient has anxiety, depression or PTSD. Discharge with intra-nasal naloxone if discharged to home with opioids. Educated patient today on rationale for narcan use. Plan for education prior to discharge.   No driving while on opioids, patient  Informed.     Ami Benítez-Catarino AVALOS CNP  Pain Management and Palliative Care  [facility]  Pgr: [phone]    Time Spent on this Encounter   I spent  20 minutes (11:10-11:20) in assessment of the patient, counseling and discussion with the patient and family as documented in sections below. Another 5 minutes in review of chart, documentation, coordination of care and discussion with the health care team.      Interval History   Pain better controlled  Rating 6/10  Up out of bed with walker and assist.  Participating in Physical Therapy    Continues with c/o increaser left leg numbness and right foot digit numbness. Surgical site pain.    Review of Systems    CONSTITUTIONAL: NEGATIVE for fever, chills, change in weight  ENT/MOUTH: NEGATIVE for ear, mouth and throat problems  RESP: NEGATIVE for  significant cough or SOB  CV: NEGATIVE for chest pain, palpitations or peripheral edema    Physical Exam   Temp:  [95.4  F (35.2  C)-98.5  F (36.9  C)] 97.7  F (36.5  C)  Pulse:  [84] 84  Heart Rate:  [] 85  Resp:  [7-27] 15  BP: (100-172)/() 106/72  FiO2 (%):  [100 %] 100 %  SpO2:  [93 %-100 %] 98 %  249 lbs 0 oz  GEN:  Alert, oriented x 3, appears comfortable, NAD.  HEENT:  Normocephalic/atraumatic, no scleral icterus, no nasal discharge, mouth moist.  CV:  RRR, S1, S2; no murmurs or other irregularities noted.  +3 DP/PT pulses bilatererally; no edema BLE.  RESP:  Clear to auscultation bilaterally without rales/rhonchi/wheezing/retractions.  Symmetric chest rise on inhalation noted.  Normal respiratory effort.  ABD:  Rounded, soft, non-tender/non-distended.  +BS  EXT:  Edema & pulses as noted above.  CMS intact x 4.    M/S:  Nontender to palpation throughout. ADLER X4.    SKIN:  Dry to touch, no exanthems noted in the visualized areas.    NEURO: Symmetric strength +5/5. Improved dorsiflexion/plantar flexion left foot.  Sensation to touch intact all extremities.   There is no area of allodynia or hyperesthesia.  PAIN BEHAVIOR: Cooperative  Psych:  Normal affect.  Calm, cooperative, conversant appropriately.    Medications       acetaminophen  975 mg Oral Q8H     hydrOXYzine  25-50 mg Oral Q6H     ibuprofen  800 mg Oral TID     lidocaine   Transdermal Q24H    And     lidocaine   Transdermal Q8H     loratadine  10 mg Oral Daily     predniSONE (DELTASONE) tablet 20 mg  20 mg Oral Daily     sertraline (ZOLOFT) tablet 100 mg  100 mg Oral Daily     sodium chloride (PF)  3 mL Intracatheter Q8H     topiramate  25 mg Oral Daily     topiramate (TOPAMAX) tablet 50 mg  50 mg Oral Daily       Data   Results for orders placed or performed during the hospital encounter of 08/09/18 (from the past 24 hour(s))   XR Surgery NIDHI L/T 5 Min Fluoro w Stills    Narrative    This exam was marked as non-reportable because it will  not be read by a   radiologist or a [place] non-radiologist provider.             Hospitalist IP Consult: Patient to be seen: Routine - within 24 hours; Hospitalist Consult; Consultant may enter orders: Yes    Fidel    Avlin Santiago,      8/9/2018  4:05 PM  [facility]  Hospitalist Consult Note    Name: Tiffany Solis      MRN: [id number]  YOB: 1980    Age: 37 year old  Date of admission: 8/9/2018  Primary care provider: Sandra Bender     Requesting Physician:  Dr Jenkins  Reason for consultation:  Post-operative medical management         Assessment and Plan:   Tiffany Solis is a 37 year old female with a history of   depression, migraines, and lumbar spine disease who was admitted   for lumbar spine fusion.    1. Lumbar fusion on 8/9: The patient is doing well, currently has   well controlled pain and is hemodynamically stable. The   hospitalist service will defer diet, activity, DVT prophylaxis,   and pain control to the surgical service. Currently the patient   is on PCDs for DVT prophylaxis. We agree with continued physical   and occupational therapy. Social work may be consulted for   possible placement needs. Please continue incentive spirometry   and check routine follow up hemoglobin to evaluate for surgical   blood loss and the potential need for transfusion.     2. Depression and migraines: Stable.  Resumed PTA medications.    3.  Dermatitis: Resumed 5 day course of prednisone as already   approved by spine surgery.      Code status: FULL  Prophylaxis: Currently on PCDs, ultimately deferred to the   primary service.  Disposition: Deferred to the primary service    Thank you for the consultation, we will continue to follow along   during the hospitalization. Please page with any questions or   concerns.         History of Present Illness:   Tiffany Solis is a 37 year old female who is being   hospitalized for lumbar fusion. Pre-operative note was fully  "  reviewed and recommendations acknowledged. Op note and anesthesia   notes and flow sheets were also reviewed.     The patient had no complications related to the procedure and has   had an unremarkable post-operative course to this point.   Currently pain is adequately controlled. No nausea, vomiting,   diarrhea or constipation. No fevers, chills, diaphoresis. No   chest pain, palpitations, dyspnea. No excessive somnolence and   patient is fully alert and oriented. The patient has no other   complaints at this time.                Past Medical History:     Past Medical History:   Diagnosis Date     Depression     & anxiety     Low back pain     & radiates down both legs (mainly left leg).  Also, has numbness   & tingling down both legs.     Migraine              Past Surgical History:     Past Surgical History:   Procedure Laterality Date     BACK SURGERY      cervical fusion x 7     ENT SURGERY      Tonsillectomy     GYN SURGERY      tubal ligation               Social History:     Social History   Substance Use Topics     Smoking status: Never Smoker     Smokeless tobacco: Never Used     Alcohol use Yes      Comment: rare             Family History:   Family history was fully reviewed and non-contributory in this   case.          Allergies:     Allergies   Allergen Reactions     Kiwi Other (See Comments)     \"my mouth bleeds\"     Neurontin [Gabapentin] Other (See Comments)     Memory loss     Pineapple Other (See Comments)     \"my throat swells\"     Seasonal Allergies Other (See Comments)     hayfever symptoms             Medications:     Prior to Admission medications    Medication Sig Last Dose Taking? Auth Provider   calcium carbonate (OS-RAVI 500 MG [place]. CA) 500 MG tablet Take 1   tablet by mouth 2 times daily 8/2/2018 Yes Reported, Patient   HYDROcodone-acetaminophen (NORCO)  MG per tablet Take 1   tablet by mouth every 4 hours as needed for severe pain 8/9/2018   at Unknown time Yes Reported, Patient " "  loratadine (CLARITIN) 10 MG tablet Take 10 mg by mouth daily   8/9/2018 at Unknown time Yes Reported, Patient   METHOCARBAMOL PO Take 500 mg by mouth 3 times daily as needed for   muscle spasms Past Week at Unknown time Yes Reported, Patient   PREDNISONE PO Take 20 mg by mouth daily 8/9/2018 at Unknown time   Yes Reported, Patient   SERTRALINE HCL PO Take 100 mg by mouth daily 8/9/2018 at Unknown   time Yes Reported, Patient   TOPIRAMATE PO Take 50 mg by mouth daily 8/9/2018 at Unknown time   Yes Reported, Patient   VITAMIN D, CHOLECALCIFEROL, PO Take 3,000 Units by mouth daily   8/2/2018 Yes Reported, Patient   IBUPROFEN PO Take 400 mg by mouth every 6 hours as needed for   moderate pain 8/2/2018  Reported, Patient       Current hospital administered medication list (MAR) also   reviewed.          Review of Systems:   A comprehensive greater than 10 system review of systems was   carried out.  Pertinent positives and negatives are noted above.    Otherwise negative for contributory info.            Physical Exam:   Blood pressure 115/65, temperature 98.1  F (36.7  C), temperature   source Temporal, resp. rate 14, height 1.549 m (5' 1\"), weight   112.9 kg (249 lb), last menstrual period 07/02/2018, SpO2 96 %.  Exam:  GENERAL: No apparent distress. Awake, alert, and fully oriented.  HEENT: Normocephalic, atraumatic. Extraocular movements intact.  CARDIOVASCULAR: Regular rate and rhythm without murmurs or rubs.   No S3.  PULMONARY: Clear bilaterally.  ABDOMINAL: Soft, non-tender, non-distended. Bowel sounds   normoactive. No hepatosplenomegaly.  EXTREMITIES: No cyanosis or clubbing. No edema.  NEUROLOGICAL: CN 2-12 grossly intact, no focal neurological   deficits.  DERMATOLOGICAL: No rash, ulcer, ecchymoses, jaundice.         Data:   EKG/Telemetry:  Personally reviewed available data.    Laboratory: Personally reviewed available data.  No results for input(s): WBC, HGB, HCT, MCV, PLT in the last 168   hours.  No " results for input(s): NA, POTASSIUM, CHLORIDE, CO2, ANIONGAP,   GLC, BUN, CR, GFRESTIMATED, GFRESTBLACK, RAVI in the last 168   hours.  No results for input(s): CULT in the last 168 hours.    Imaging: Personally reviewed available data.  Recent Results (from the past 24 hour(s))   XR Surgery NIDHI L/T 5 Min Fluoro w Stills    Narrative    This exam was marked as non-reportable because it will not be   read by a   radiologist or a Telephone non-radiologist provider.                   Alvin Santiago DO MPH  [facility]ly [facility] Hospitalist  [address] [place] [address]  Pager: [phone]  08/09/2018     Basic metabolic panel   Result Value Ref Range    Sodium 138 133 - 144 mmol/L    Potassium 4.1 3.4 - 5.3 mmol/L    Chloride 107 94 - 109 mmol/L    Carbon Dioxide 25 20 - 32 mmol/L    Anion Gap 6 3 - 14 mmol/L    Glucose 113 (H) 70 - 99 mg/dL    Urea Nitrogen 13 7 - 30 mg/dL    Creatinine 0.63 0.52 - 1.04 mg/dL    GFR Estimate >90 >60 mL/min/1.7m2    GFR Estimate If Black >90 >60 mL/min/1.7m2    Calcium 8.9 8.5 - 10.1 mg/dL   CBC with platelets differential   Result Value Ref Range    WBC 12.0 (H) 4.0 - 11.0 10e9/L    RBC Count 3.91 3.8 - 5.2 10e12/L    Hemoglobin 11.2 (L) 11.7 - 15.7 g/dL    Hematocrit 35.4 35.0 - 47.0 %    MCV 91 78 - 100 fl    MCH 28.6 26.5 - 33.0 pg    MCHC 31.6 31.5 - 36.5 g/dL    RDW 13.2 10.0 - 15.0 %    Platelet Count 346 150 - 450 10e9/L    Diff Method Automated Method     % Neutrophils 74.9 %    % Lymphocytes 16.2 %    % Monocytes 8.0 %    % Eosinophils 0.2 %    % Basophils 0.2 %    % Immature Granulocytes 0.5 %    Nucleated RBCs 0 0 /100    Absolute Neutrophil 9.0 (H) 1.6 - 8.3 10e9/L    Absolute Lymphocytes 2.0 0.8 - 5.3 10e9/L    Absolute Monocytes 1.0 0.0 - 1.3 10e9/L    Absolute Eosinophils 0.0 0.0 - 0.7 10e9/L    Absolute Basophils 0.0 0.0 - 0.2 10e9/L    Abs Immature Granulocytes 0.1 0 - 0.4 10e9/L    Absolute Nucleated RBC 0.0

## 2018-08-11 ENCOUNTER — APPOINTMENT (OUTPATIENT)
Dept: PHYSICAL THERAPY | Facility: CLINIC | Age: 38
DRG: 454 | End: 2018-08-11
Attending: NEUROLOGICAL SURGERY
Payer: MEDICARE

## 2018-08-11 ENCOUNTER — APPOINTMENT (OUTPATIENT)
Dept: OCCUPATIONAL THERAPY | Facility: CLINIC | Age: 38
DRG: 454 | End: 2018-08-11
Attending: NEUROLOGICAL SURGERY
Payer: MEDICARE

## 2018-08-11 VITALS
HEART RATE: 82 BPM | BODY MASS INDEX: 47.01 KG/M2 | SYSTOLIC BLOOD PRESSURE: 126 MMHG | OXYGEN SATURATION: 94 % | WEIGHT: 249 LBS | TEMPERATURE: 97 F | DIASTOLIC BLOOD PRESSURE: 82 MMHG | HEIGHT: 61 IN | RESPIRATION RATE: 16 BRPM

## 2018-08-11 PROCEDURE — 99207 ZZC CDG-MDM COMPONENT: MEETS MODERATE - UP CODED: CPT | Performed by: HOSPITALIST

## 2018-08-11 PROCEDURE — 99232 SBSQ HOSP IP/OBS MODERATE 35: CPT | Performed by: HOSPITALIST

## 2018-08-11 PROCEDURE — 97165 OT EVAL LOW COMPLEX 30 MIN: CPT | Mod: GO

## 2018-08-11 PROCEDURE — 97116 GAIT TRAINING THERAPY: CPT | Mod: GP

## 2018-08-11 PROCEDURE — 97535 SELF CARE MNGMENT TRAINING: CPT | Mod: GO

## 2018-08-11 PROCEDURE — 97530 THERAPEUTIC ACTIVITIES: CPT | Mod: GP

## 2018-08-11 PROCEDURE — 40000133 ZZH STATISTIC OT WARD VISIT

## 2018-08-11 PROCEDURE — 25000132 ZZH RX MED GY IP 250 OP 250 PS 637: Mod: GY | Performed by: NURSE PRACTITIONER

## 2018-08-11 PROCEDURE — A9270 NON-COVERED ITEM OR SERVICE: HCPCS | Mod: GY | Performed by: NEUROLOGICAL SURGERY

## 2018-08-11 PROCEDURE — 25000132 ZZH RX MED GY IP 250 OP 250 PS 637: Mod: GY | Performed by: HOSPITALIST

## 2018-08-11 PROCEDURE — 25000132 ZZH RX MED GY IP 250 OP 250 PS 637: Mod: GY | Performed by: NEUROLOGICAL SURGERY

## 2018-08-11 PROCEDURE — 25000125 ZZHC RX 250: Performed by: NEUROLOGICAL SURGERY

## 2018-08-11 PROCEDURE — A9270 NON-COVERED ITEM OR SERVICE: HCPCS | Mod: GY | Performed by: NURSE PRACTITIONER

## 2018-08-11 PROCEDURE — A9270 NON-COVERED ITEM OR SERVICE: HCPCS | Mod: GY | Performed by: HOSPITALIST

## 2018-08-11 PROCEDURE — 40000193 ZZH STATISTIC PT WARD VISIT

## 2018-08-11 RX ADMIN — ACETAMINOPHEN 975 MG: 325 TABLET, FILM COATED ORAL at 08:31

## 2018-08-11 RX ADMIN — OXYCODONE HYDROCHLORIDE 20 MG: 5 TABLET ORAL at 12:16

## 2018-08-11 RX ADMIN — OXYCODONE HYDROCHLORIDE 15 MG: 5 TABLET ORAL at 04:58

## 2018-08-11 RX ADMIN — IBUPROFEN 800 MG: 800 TABLET ORAL at 13:25

## 2018-08-11 RX ADMIN — IBUPROFEN 800 MG: 800 TABLET ORAL at 08:30

## 2018-08-11 RX ADMIN — HYDROXYZINE HYDROCHLORIDE 50 MG: 25 TABLET ORAL at 04:49

## 2018-08-11 RX ADMIN — ACETAMINOPHEN 975 MG: 325 TABLET, FILM COATED ORAL at 00:28

## 2018-08-11 RX ADMIN — OXYCODONE HYDROCHLORIDE 15 MG: 5 TABLET ORAL at 00:28

## 2018-08-11 RX ADMIN — HYDROXYZINE HYDROCHLORIDE 50 MG: 25 TABLET ORAL at 10:05

## 2018-08-11 RX ADMIN — ZOLPIDEM TARTRATE 5 MG: 5 TABLET, FILM COATED ORAL at 00:28

## 2018-08-11 RX ADMIN — TOPIRAMATE 75 MG: 25 TABLET, FILM COATED ORAL at 08:30

## 2018-08-11 RX ADMIN — LORATADINE 10 MG: 10 TABLET ORAL at 08:30

## 2018-08-11 RX ADMIN — SERTRALINE HYDROCHLORIDE 100 MG: 100 TABLET ORAL at 08:30

## 2018-08-11 RX ADMIN — OXYCODONE HYDROCHLORIDE 15 MG: 5 TABLET ORAL at 08:30

## 2018-08-11 RX ADMIN — PREDNISONE 20 MG: 20 TABLET ORAL at 08:31

## 2018-08-11 ASSESSMENT — ACTIVITIES OF DAILY LIVING (ADL)
ADLS_ACUITY_SCORE: 12
PREVIOUS_RESPONSIBILITIES: MEAL PREP;HOUSEKEEPING;LAUNDRY;SHOPPING;MEDICATION MANAGEMENT;FINANCES;DRIVING
ADLS_ACUITY_SCORE: 12

## 2018-08-11 NOTE — CONSULTS
Request for Social Work consult received. Patient's status and needs were reviewed with nursing staff/ SW met with at bedside for assessment of discharge planning needs. Patient is alert and oriented X3, conversant and able to participate in discharge planning. Patient does not anticipate any needs upon discharge, and nursing indicated no concerns at this time. No further Social Work needs identified. If further needs arise, please alert SW department by placing another consult order. Thank You        ANGELY Matute  [phone]

## 2018-08-11 NOTE — PLAN OF CARE
"Problem: Patient Care Overview  Goal: Plan of Care/Patient Progress Review    Discharge Planner PT   Patient plan for discharge: To mother's home; sister will be staying with patient and her 4 children  Current status: Patient seated in bedside chair upon arrival.  Patient reports continued left LE numbness, patient reports that numbness has worsened; reports \"my whole leg feels like a tree stump.\"  Patient transferred to standing with 2WW and CGA.  Patient amb 200 feet with 2WW and step through gait patten, verbal cueing provided for upright posture and decreased reliance on UE support.  Patient negotiated 4 steps with 2 railings, CGA and direct verbal cueing for technique.  Patient then negotiated 4 steps with single rail and SEC with CGA and verbal cueing.  Plan to trial stair negotiation with SEC and no rail (home environment) this afternoon.  Upon return to room, patient transferred to sitting in bedside chair.   Barriers to return to prior living situation: Stairs to enter home  Recommendations for discharge: Home  Rationale for recommendations: Anticipate that with continued IPPT the pt will be able to complete all mobility skills required for safe discharge home.       Entered by: Eunice Deluca 08/11/2018 9:57 AM           "

## 2018-08-11 NOTE — PROGRESS NOTES
[facility]    Hospitalist Progress Note  Name: Tiffany Solis    MRN: [id number]  Provider:  Alvin Santiago DO MPH  Date of Service: 08/11/2018    Summary of Stay: Tiffany Solis is a 37 year old female with a history of depression, migraines, and lumbar spine disease who was admitted for lumbar spine fusion.     1. Lumbar fusion on 8/9: The patient is doing well, currently has well controlled pain and is hemodynamically stable. The hospitalist service will defer diet, activity, DVT prophylaxis, and pain control to the surgical service. Currently the patient is on PCDs for DVT prophylaxis. We agree with continued physical and occupational therapy. Social work may be consulted for possible placement needs. Please continue incentive spirometry and check routine follow up hemoglobin to evaluate for surgical blood loss and the potential need for transfusion.      2. Depression and migraines: Stable.  Resumed PTA medications.     3. Dermatitis: Resumed 5 day course of prednisone as already approved by spine surgery.      4. Leukocytosis: Likely related to steroids, no fever nor infectious symptoms.    5. Acute blood loss anemia: Hemoglobin stable, monitor for now and no indication for transfusion.      Thank you for the consultation, we will continue to follow along during the hospitalization. Please page with any questions or concerns.    DVT Prophylaxis: Defer to primary service  Code Status: Prior  Disposition: Defer to primary service.   Incidental Findings: None.  Family updated today: Yes      Interval History   The patient reports doing well. Pain much improved. No chest pain or shortness of breath. No nausea, vomiting, diarrhea, constipation. No fevers. No other specific complaints identified.     -Data reviewed today: I personally reviewed all new labs and imaging results over the last 24 hours.     Physical Exam   Temp: 97  F (36.1  C) Temp src: Oral BP: 126/82 Pulse: 82 Heart Rate: 95 Resp: 16  SpO2: 94 % O2 Device: None (Room air)    Vitals:    08/02/18 1528 08/09/18 0822   Weight: 112.5 kg (248 lb) 112.9 kg (249 lb)     Vital Signs with Ranges  Temp:  [96.2  F (35.7  C)-97  F (36.1  C)] 97  F (36.1  C)  Pulse:  [82] 82  Heart Rate:  [] 95  Resp:  [14-18] 16  BP: (106-126)/(63-82) 126/82  SpO2:  [94 %-98 %] 94 %  I/O last 3 completed shifts:  In: 2562 [P.O.:1800; I.V.:762]  Out: 2400 [Urine:2400]    GENERAL: No apparent distress. Awake, alert, and fully oriented.  HEENT: Normocephalic, atraumatic. Extraocular movements intact.  CARDIOVASCULAR: Regular rate and rhythm without murmurs or rubs. No S3.  PULMONARY: Clear bilaterally.  GASTROINTESTINAL: Soft, non-tender, non-distended. Bowel sounds normoactive.   EXTREMITIES: No cyanosis or clubbing. No edema.  NEUROLOGICAL: CN 2-12 grossly intact, no focal neurological deficits.  DERMATOLOGICAL: No rash, ulcer, bruising, nor jaundice.     Medications       acetaminophen  975 mg Oral Q8H     hydrOXYzine  25-50 mg Oral Q6H     ibuprofen  800 mg Oral TID     lidocaine   Transdermal Q24H    And     lidocaine   Transdermal Q8H     loratadine  10 mg Oral Daily     predniSONE (DELTASONE) tablet 20 mg  20 mg Oral Daily     sertraline (ZOLOFT) tablet 100 mg  100 mg Oral Daily     sodium chloride (PF)  3 mL Intracatheter Q8H     topiramate  75 mg Oral Daily     Data     Laboratory:    Recent Labs  Lab 08/10/18  0622   WBC 12.0*   HGB 11.2*   HCT 35.4   MCV 91          Recent Labs  Lab 08/10/18  0622      POTASSIUM 4.1   CHLORIDE 107   CO2 25   ANIONGAP 6   *   BUN 13   CR 0.63   GFRESTIMATED >90   GFRESTBLACK >90   RAVI 8.9     No results for input(s): CULT in the last 168 hours.    Imaging:  No results found for this or any previous visit (from the past 24 hour(s)).      Alvin Santiago DO MPH  [facility] Hospitalist  [address] [place] [address]  Pager: [phone]  08/11/2018

## 2018-08-11 NOTE — PROGRESS NOTES
DAILY PROGRESS NOTE    Tiffany Solis is a 37 year old old female admitted on 8/9/2018  7:52 AM.    Subjective  Comfortable      Objective    AAOx3, ADLER , f/c 4/4   Ambulating, mild left L5 numbness and TA 4+    Hemoglobin   Date Value Ref Range Status   08/10/2018 11.2 (L) 11.7 - 15.7 g/dL Final   ]      Impression / Plan       Plan for today:    Patient doing well  Ambulate   Pt clearance and dc  Home today

## 2018-08-11 NOTE — PLAN OF CARE
Problem: Patient Care Overview  Goal: Plan of Care/Patient Progress Review    PT: Attempted to see patient for scheduled PT session; patient and visitors sleeping soundly upon arrival, patient reported that she was due for pain medications, requested PT return later.

## 2018-08-11 NOTE — PLAN OF CARE
Problem: Patient Care Overview  Goal: Plan of Care/Patient Progress Review  Patient alert and oriented.  Up with 1 assist/gait, walker, and abd binder.  Lung sounds clear.  Bowel sounds hypoactive.  Regular diet.  Pain managed with scheduled tylenol, atarax, ibuprofen, prn ice and oxycodone.  Ambien given for sleep at bedtime.  Left foot to calf area numbness, right foot toe numbness, otherwise CMS intact.  Dressing to back CDI.  Will continue to monitor.

## 2018-08-11 NOTE — PLAN OF CARE
"Problem: Laminectomy/Foraminotomy/Discectomy (Adult)  Goal: Signs and Symptoms of Listed Potential Problems Will be Absent, Minimized or Managed (Laminectomy/Foraminotomy/Discectomy)  Signs and symptoms of listed potential problems will be absent, minimized or managed by discharge/transition of care (reference Laminectomy/Foraminotomy/Discectomy (Adult) CPG).   Outcome: Improving  Sleeping between cares. Rates pain 7/10 but able to complete ADLs, able to watch TV. \"Pain controlled\" with oxycodone. Vital stable. Back dressing changed. Steri strips intact. No new drainage. Incisions small x3, WNL. Voiding. On menses. Good strength to bilateral legs. \"Numbness seems to be getting a little worse.\" numbness to right toes and left lower leg. Up with stand by assist with black back brace and walker. Tolerated ambulation well to the bathroom. Her plan is to complete PT at 1330 and then probable discharge to home today. Bed alarm on for safety.     1415 discharge to home with family. Verbalized understanding to all discharge instructions. Pleasant. No acute variances. Medications and follow up appointments. Up with stand by assist, brace on. Pain controlled.   "

## 2018-08-11 NOTE — PROGRESS NOTES
08/11/18 0830   Quick Adds   Type of Visit Initial Occupational Therapy Evaluation   Living Environment   Lives With spouse;child(efrem), dependent  (Pt has children ages 8.9,13 )   Living Arrangements house  (2 story home )   Home Accessibility bed and bath on same level;tub/shower is not walk in   Stair Railings at Home none   Transportation Available car;family or friend will provide   Living Environment Comment Pt will plan on staying with her sister, all needs met on one level.    Self-Care   Usual Activity Tolerance good   Current Activity Tolerance moderate   Regular Exercise no   Equipment Currently Used at Home walker, rolling   Activity/Exercise/Self-Care Comment Was using the walker recently secondary to LE numbness and back pain, pt was using the walker for the last 2 months    Functional Level Prior   Ambulation 1-->assistive equipment   Transferring 1-->assistive equipment   Toileting 0-->independent   Bathing 0-->independent   Dressing 0-->independent   Fall history within last six months yes   Number of times patient has fallen within last six months 1   General Information   Onset of Illness/Injury or Date of Surgery - Date 08/09/18   Referring Physician Thu Delgado MD   Patient/Family Goals Statement Home today    Additional Occupational Profile Info/Pertinent History of Current Problem Per chart: Tiffany Solis is a 37 year old female with a history of depression, migraines, and lumbar spine disease who was admitted for lumbar spine fusion.   Precautions/Limitations fall precautions;spinal precautions   Cognitive Status Examination   Orientation orientation to person, place and time   Level of Consciousness alert   Visual Perception   Visual Perception Wears glasses   Sensory Examination   Sensory Quick Adds (left leg numbness; right toes numbness )   Pain Assessment   Patient Currently in Pain Yes, see Vital Sign flowsheet   Transfer Skills   Transfer Transfer Safety Analysis  "Bed/Chair;Transfer Skill: Stand to Sit;Transfer Safety Analysis Sit/Stand   Transfer Skill: Bed to Chair/Chair to Bed   Level of [place]: Bed to Chair stand-by assist   Transfer Skill: Sit to Stand   Level of [place]: Sit/Stand stand-by assist   Toilet Transfer   Toilet Transfer Toilet Transfer Skill;Toilet Transfer Safety Analysis   Transfer Skill: Toilet Transfer   Level of [place]: Toilet (Modified independent )   Lower Body Dressing   Level of [place]: Dress Lower Body stand-by assist   Instrumental Activities of Daily Living (IADL)   Previous Responsibilities meal prep;housekeeping;laundry;shopping;medication management;finances;driving  (Having assist as needed for IADLs (meal prep,chroes,laundry))   General Therapy Interventions   Planned Therapy Interventions ADL retraining;IADL retraining;transfer training   Clinical Impression   Criteria for Skilled Therapeutic Interventions Met yes, treatment indicated   OT Diagnosis Decreased endurance for I/ADLs   Influenced by the following impairments Decreased endurance for I/ADLs   Assessment of Occupational Performance 1-3 Performance Deficits   Identified Performance Deficits Decreased endurance for I/ADLs (dressing, bathing, toileting)   Clinical Decision Making (Complexity) Low complexity   Predicted Duration of Therapy Intervention (days/wks) evaluation and one treatment    Anticipated Discharge Disposition Home with Assist   Risks and Benefits of Treatment have been explained. Yes   Patient, Family & other staff in agreement with plan of care Yes   [facility] TM \"6 Clicks\"   2016, Trustees of [facility], under license to [employer].  All rights reserved.   6 Clicks Short Forms Daily Activity Inpatient Short Form   [facility]  \"6 Clicks\" Daily Activity Inpatient Short Form   1. Putting on and taking off regular lower body clothing? 3 - A Little   2. Bathing (including washing, rinsing, drying)? 3 - A " Little   3. Toileting, which includes using toilet, bedpan or urinal? 3 - A Little   4. Putting on and taking off regular upper body clothing? 3 - A Little   5. Taking care of personal grooming such as brushing teeth? 3 - A Little   6. Eating meals? 4 - None   Daily Activity Raw Score (Score out of 24.Lower scores equate to lower levels of function) 19   Total Evaluation Time   Total Evaluation Time (Minutes) 8

## 2018-08-11 NOTE — PLAN OF CARE
Problem: Patient Care Overview  Goal: Plan of Care/Patient Progress Review  OT: Evaluation and treatment initiated. Pt lives in a 2 story home with her spouse and children. Prior pt independent in all ADLs and most IADLs, receiving assist as needed for IADLs including chores and laundry. Pt using a walker for the last 2 months.   Discharge Planner OT   Patient plan for discharge: Home today   Current status: Pt completed upper and lower body dressing with SBA and compensatory techniques.  Pt ambulated to the bathroom with walker and SBA/CGA and transferred to the toilet with Modified independence. Educated on walk in shower transfer, pt completed walk in shower transfer with SBA.  Pt reported that family will be available to assist as needed.  Educated on AE/DME.  All IP OT goals met, no further needs identified.   Barriers to return to prior living situation: stairs (defer to PT); pain  Recommendations for discharge: Home with assist from family for I/ADLs as needed   Rationale for recommendations: Pt has met all IP OT goals. Pt will have assist as needed for all I/ADLs and plans to implement AE/DME within home.      Occupational Therapy Discharge Summary    Reason for therapy discharge:    All goals and outcomes met, no further needs identified.    Progress towards therapy goal(s). See goals on Care Plan in [facility] electronic health record for goal details.  Goals met    Therapy recommendation(s):Assist a needed for I/ADLs            Entered by: Dc Travis 08/11/2018 9:27 AM

## 2018-08-11 NOTE — PLAN OF CARE
Problem: Patient Care Overview  Goal: Plan of Care/Patient Progress Review    Discharge Planner PT   Patient plan for discharge: To mother's home; sister will be staying with patient and her 4 children  Current status:Patient supine upon arrival. Demonstrated independent bed mobility through log roll.  Spouse educated in spinal precautions.  Patient reported increased left LE numbness;  it feels like my bones are numb.   Patient plans to discharge this afternoon; recommended that if patient s LE begins to feel weak as well as numb for patient to contact surgeon office.  Patient performed repeated transfers between sitting and standing with 2WW and graded assist from CGA to modified independent.  Patient amb 250 feet with 2WW and graded assist from CGA to modified independent.  Patient negotiated 4 steps with single rail and SEC; spouse educated in correct technique to assist with ambulation.  Patient negotiated 4 steps with SEC and HHA from therapist, then from sister.  No further inpatient physical therapy needs   Barriers to return to prior living situation: none  Recommendations for discharge: Home  Rationale for recommendations: Goals met       Entered by: Eunice Deluca 08/11/2018 2:01 PM         Physical Therapy Discharge Summary    Reason for therapy discharge:    All goals and outcomes met, no further needs identified.    Progress towards therapy goal(s). See goals on Care Plan in [facility] electronic health record for goal details.  Goals met    Therapy recommendation(s):    No further therapy is recommended.

## 2018-08-11 NOTE — DISCHARGE INSTRUCTIONS
Opioid Medication Information    You have been given a prescription for an opioid (narcotic) pain medicine and/or have received a pain medicine. These medicines can make you drowsy or impaired. You must not drive, operate dangerous equipment, or engage in any other dangerous activities while taking these medications. If you drive while taking these medications, you could be arrested for DUI, or driving under the influence. Do not drink any alcohol while you are taking these medications.   Opioid pain medications can cause addiction. If you have a history of chemical dependency of any type, you are at a higher risk of becoming addicted to pain medications.  Only take these prescribed medications to treat your pain when all other options have been tried. Take it for as short a time and as few doses as possible. Store your pain pills in a secure place, as they are frequently stolen and provide a dangerous opportunity for children or visitors in your house to start abusing these powerful medications. We will not replace any lost or stolen medicine.  As soon as your pain is better, you should seek out a drug take back program (see your local police department) to dispose of them.   Over-the-counter medications and prescription drugs can pollute seth and be harmful to humans, fish, and other wildlife when disposed of improperly -- do not flush medications down the toilet or place in the trash.  Properly disposing of medicines is important to prevent abuse or poisoning and protect the environment.     Prescription and over-the-counter medications are collected anonymously from residents for free at [facility] drop-off locations. Visit the [facility]'s Office Prescription Drug Drop-Off page for the list of drop-off sites and information about the program.       [place]  Police Department [phone] Mon-Fri  8am to 4:30pm     [place]  Police Department [phone] 24hrs a day    [place]  Police  Department [phone] Mon-Fri  8am to 6:00pm     [place]  Police Department [phone] Mon-Fri  8am to 4:30pm     [place]  Police Department [phone] 24hrs a day      [place]  Police Department [phone] Mon-Fri  8am to 4:30pm   Many prescription pain medications contain Tylenol  (acetaminophen), including Vicodin , Tylenol #3 , Norco , Lortab , and Percocet .  You should not take any extra pills of Tylenol  if you are using these prescription medications or you can get very sick.  Do not ever take more than 3000 mg of acetaminophen in any 24 hour period.  All opioids tend to cause constipation. Drink plenty of water and eat foods that have a lot of fiber, such as fruits, vegetables, prune juice, apple juice and high fiber cereal.  Take a laxative if you don t move your bowels at least every other day. Miralax , Milk of Magnesia, Colace , or Senna  can be used to keep you regular.  You will likely need to continue stool softeners and stimulants while taking opioids.     Naloxone Prescription  It has been determined you are at high risk of overdose of opioids as{Opioid Overdose Risk Factors:994276}.  You have been prescribed intranasal naloxone which is a medication that can temporarily reverse the effects of opioid pain medications if administered by a friend and family member while waiting for EMS to arrive.      The following video has been shown to you in the hospital, it recommend that you share it with your close family or friends in case you need assistance in an emergency.  http://prescribetoprevent.org/patient-education/videos/     You have been given the following information on Opioid overdose prevention and opioid safety https://store.[facility].gov/shin/content//BSZ20-6213/safety-advice-for-patients-family-members.pdf

## 2018-08-14 ENCOUNTER — HEALTH MAINTENANCE LETTER (OUTPATIENT)
Age: 38
End: 2018-08-14

## 2020-03-11 ENCOUNTER — HEALTH MAINTENANCE LETTER (OUTPATIENT)
Age: 40
End: 2020-03-11

## 2020-12-27 ENCOUNTER — HEALTH MAINTENANCE LETTER (OUTPATIENT)
Age: 40
End: 2020-12-27

## 2021-04-25 ENCOUNTER — HEALTH MAINTENANCE LETTER (OUTPATIENT)
Age: 41
End: 2021-04-25

## 2021-10-09 ENCOUNTER — HEALTH MAINTENANCE LETTER (OUTPATIENT)
Age: 41
End: 2021-10-09

## 2022-05-21 ENCOUNTER — HEALTH MAINTENANCE LETTER (OUTPATIENT)
Age: 42
End: 2022-05-21

## 2022-09-17 ENCOUNTER — HEALTH MAINTENANCE LETTER (OUTPATIENT)
Age: 42
End: 2022-09-17

## 2023-03-15 ENCOUNTER — TRANSFERRED RECORDS (OUTPATIENT)
Dept: HEALTH INFORMATION MANAGEMENT | Facility: CLINIC | Age: 43
End: 2023-03-15
Payer: COMMERCIAL

## 2023-03-15 ENCOUNTER — MEDICAL CORRESPONDENCE (OUTPATIENT)
Dept: HEALTH INFORMATION MANAGEMENT | Facility: CLINIC | Age: 43
End: 2023-03-15
Payer: COMMERCIAL

## 2023-03-16 ENCOUNTER — TRANSCRIBE ORDERS (OUTPATIENT)
Dept: OTHER | Age: 43
End: 2023-03-16

## 2023-03-16 DIAGNOSIS — C44.99 DERMATOFIBROSARCOMA PROTUBERANS: Primary | ICD-10-CM

## 2023-06-04 ENCOUNTER — HEALTH MAINTENANCE LETTER (OUTPATIENT)
Age: 43
End: 2023-06-04

## 2024-02-24 ENCOUNTER — HEALTH MAINTENANCE LETTER (OUTPATIENT)
Age: 44
End: 2024-02-24

## 2024-08-23 NOTE — PLAN OF CARE
Problem: Laminectomy/Foraminotomy/Discectomy (Adult)  Goal: Signs and Symptoms of Listed Potential Problems Will be Absent, Minimized or Managed (Laminectomy/Foraminotomy/Discectomy)  Signs and symptoms of listed potential problems will be absent, minimized or managed by discharge/transition of care (reference Laminectomy/Foraminotomy/Discectomy (Adult) CPG).   Outcome: Improving  Afeb, vss, numbness still in her feet, dressing reinforced over night and that has stayed clean and dry. [place] out this am and pt's has voided, also started her menses. Up with SBA of 1 with brace on and walker. Moving well getting in and out of bed independently. Pain team got rid of all IV pain meds, pt's pain manageable on po meds only. Pt going home tomorrow to stay with her sister.        [TextEntry] : Continue balanced diet with all food groups. Brush teeth twice a day with toothbrush. Recommend visit to dentist. Help child to maintain consistent daily routines and sleep schedule. School discussed. Ensure home is safe. Teach child about personal safety. Use consistent, positive discipline. Limit screen time to no more than 2 hours per day. Encourage physical activity. Child needs to ride in a belt-positioning booster seat until  4 feet 9 inches has been reached and are between 8 and 12 years of age.  Can participate in all age related sports without restriction.  Return 1 year for routine well child check, sooner if concerns. 5-2-1-0 form reviewed, care coordination reviewed

## (undated) DEVICE — DECANTER BAG 2002S

## (undated) DEVICE — DRAPE C-ARM 60X42" 1013

## (undated) DEVICE — SUCTION MANIFOLD NEPTUNE 2 SYS 4 PORT 0702-020-000

## (undated) DEVICE — SU VICRYL 2-0 CT-2 CR 8X18" J726D

## (undated) DEVICE — CUSHION INSERT LG PRONE VIEW JACKSON TABLE

## (undated) DEVICE — CATH TRAY FOLEY SURESTEP 16FR W/URNE MTR STLK LATEX A303316A

## (undated) DEVICE — SYR 10ML LL W/O NDL 302995

## (undated) DEVICE — SYR 03ML LL W/O NDL 309657

## (undated) DEVICE — BAG CLEAR TRASH 1.3M 39X33" P4040C

## (undated) DEVICE — DRSG GAUZE 4X4" TRAY

## (undated) DEVICE — Device

## (undated) DEVICE — ESU GROUND PAD ADULT W/CORD E7507

## (undated) DEVICE — GLOVE PROTEXIS POWDER FREE SMT 8.0  2D72PT80X

## (undated) DEVICE — GLOVE PROTEXIS POWDER FREE 8.0 ORTHOPEDIC 2D73ET80

## (undated) DEVICE — DRAPE IOBAN ISOLATION VERTICAL 6619

## (undated) DEVICE — ZEUS-O DISC CUTTER BLADE

## (undated) DEVICE — SYR 30ML LL W/O NDL 302832

## (undated) DEVICE — LINEN DRAPE 54X72" 5467

## (undated) DEVICE — PACK SMALL SPINE RIDGES

## (undated) DEVICE — PREP DURAPREP 26ML APL 8630

## (undated) DEVICE — BLADE KNIFE SURG 11 371111

## (undated) DEVICE — DRAPE MAYO STAND 23X54 8337

## (undated) DEVICE — DRSG ABDOMINAL 07 1/2X8" 7197D

## (undated) DEVICE — MIDAS REX DISSECTING TOOL  14MH30

## (undated) DEVICE — DECANTER VIAL 2006S

## (undated) DEVICE — K-WIRES

## (undated) DEVICE — NDL SPINAL 18GA 3.5" 405184

## (undated) DEVICE — LINEN ORTHO ACL PACK 5447

## (undated) DEVICE — DRSG STERI STRIP 1/2X4" R1547

## (undated) DEVICE — IOM 15 MIN UP TO 7 HOURS

## (undated) RX ORDER — BUPIVACAINE HYDROCHLORIDE 2.5 MG/ML
INJECTION, SOLUTION EPIDURAL; INFILTRATION; INTRACAUDAL
Status: DISPENSED
Start: 2018-08-09

## (undated) RX ORDER — CEFAZOLIN SODIUM 2 G/100ML
INJECTION, SOLUTION INTRAVENOUS
Status: DISPENSED
Start: 2018-08-09

## (undated) RX ORDER — HYDROMORPHONE HYDROCHLORIDE 1 MG/ML
INJECTION, SOLUTION INTRAMUSCULAR; INTRAVENOUS; SUBCUTANEOUS
Status: DISPENSED
Start: 2018-08-09

## (undated) RX ORDER — PROPOFOL 10 MG/ML
INJECTION, EMULSION INTRAVENOUS
Status: DISPENSED
Start: 2018-08-09

## (undated) RX ORDER — TRIAMCINOLONE ACETONIDE 40 MG/ML
INJECTION, SUSPENSION INTRA-ARTICULAR; INTRAMUSCULAR
Status: DISPENSED
Start: 2018-08-09

## (undated) RX ORDER — FENTANYL CITRATE 50 UG/ML
INJECTION, SOLUTION INTRAMUSCULAR; INTRAVENOUS
Status: DISPENSED
Start: 2018-08-09

## (undated) RX ORDER — BUPIVACAINE HYDROCHLORIDE 7.5 MG/ML
INJECTION, SOLUTION EPIDURAL; RETROBULBAR
Status: DISPENSED
Start: 2018-08-09

## (undated) RX ORDER — GLYCOPYRROLATE 0.2 MG/ML
INJECTION INTRAMUSCULAR; INTRAVENOUS
Status: DISPENSED
Start: 2018-08-09